# Patient Record
Sex: MALE | Race: WHITE | NOT HISPANIC OR LATINO | Employment: OTHER | URBAN - METROPOLITAN AREA
[De-identification: names, ages, dates, MRNs, and addresses within clinical notes are randomized per-mention and may not be internally consistent; named-entity substitution may affect disease eponyms.]

---

## 2022-10-17 ENCOUNTER — HOSPITAL ENCOUNTER (EMERGENCY)
Facility: HOSPITAL | Age: 47
Discharge: HOME | End: 2022-10-17
Attending: EMERGENCY MEDICINE
Payer: COMMERCIAL

## 2022-10-17 VITALS
OXYGEN SATURATION: 96 % | RESPIRATION RATE: 18 BRPM | DIASTOLIC BLOOD PRESSURE: 79 MMHG | SYSTOLIC BLOOD PRESSURE: 142 MMHG | TEMPERATURE: 98.3 F | HEART RATE: 92 BPM

## 2022-10-17 DIAGNOSIS — R04.0 EPISTAXIS: Primary | ICD-10-CM

## 2022-10-17 LAB
ANION GAP SERPL CALCULATED.3IONS-SCNC: 9 MMOL/L (ref 4–13)
APTT PPP: 25 SECONDS (ref 23–37)
ATRIAL RATE: 91 BPM
BASOPHILS # BLD AUTO: 0.07 THOUSANDS/ÂΜL (ref 0–0.1)
BASOPHILS NFR BLD AUTO: 1 % (ref 0–1)
BUN SERPL-MCNC: 16 MG/DL (ref 5–25)
CALCIUM SERPL-MCNC: 8.6 MG/DL (ref 8.3–10.1)
CHLORIDE SERPL-SCNC: 103 MMOL/L (ref 96–108)
CO2 SERPL-SCNC: 25 MMOL/L (ref 21–32)
CREAT SERPL-MCNC: 0.99 MG/DL (ref 0.6–1.3)
EOSINOPHIL # BLD AUTO: 0.2 THOUSAND/ÂΜL (ref 0–0.61)
EOSINOPHIL NFR BLD AUTO: 2 % (ref 0–6)
ERYTHROCYTE [DISTWIDTH] IN BLOOD BY AUTOMATED COUNT: 12.7 % (ref 11.6–15.1)
GFR SERPL CREATININE-BSD FRML MDRD: 89 ML/MIN/1.73SQ M
GLUCOSE SERPL-MCNC: 128 MG/DL (ref 65–140)
HCT VFR BLD AUTO: 48.5 % (ref 36.5–49.3)
HGB BLD-MCNC: 17 G/DL (ref 12–17)
IMM GRANULOCYTES # BLD AUTO: 0.05 THOUSAND/UL (ref 0–0.2)
IMM GRANULOCYTES NFR BLD AUTO: 0 % (ref 0–2)
INR PPP: 1.02 (ref 0.84–1.19)
LYMPHOCYTES # BLD AUTO: 3.39 THOUSANDS/ÂΜL (ref 0.6–4.47)
LYMPHOCYTES NFR BLD AUTO: 28 % (ref 14–44)
MCH RBC QN AUTO: 32.9 PG (ref 26.8–34.3)
MCHC RBC AUTO-ENTMCNC: 35.1 G/DL (ref 31.4–37.4)
MCV RBC AUTO: 94 FL (ref 82–98)
MONOCYTES # BLD AUTO: 0.94 THOUSAND/ÂΜL (ref 0.17–1.22)
MONOCYTES NFR BLD AUTO: 8 % (ref 4–12)
NEUTROPHILS # BLD AUTO: 7.66 THOUSANDS/ÂΜL (ref 1.85–7.62)
NEUTS SEG NFR BLD AUTO: 61 % (ref 43–75)
NRBC BLD AUTO-RTO: 0 /100 WBCS
P AXIS: 63 DEGREES
PLATELET # BLD AUTO: 212 THOUSANDS/UL (ref 149–390)
PMV BLD AUTO: 9.1 FL (ref 8.9–12.7)
POTASSIUM SERPL-SCNC: 3.1 MMOL/L (ref 3.5–5.3)
PR INTERVAL: 136 MS
PROTHROMBIN TIME: 13.2 SECONDS (ref 11.6–14.5)
QRS AXIS: -68 DEGREES
QRSD INTERVAL: 84 MS
QT INTERVAL: 356 MS
QTC INTERVAL: 437 MS
RBC # BLD AUTO: 5.16 MILLION/UL (ref 3.88–5.62)
SODIUM SERPL-SCNC: 137 MMOL/L (ref 135–147)
T WAVE AXIS: 49 DEGREES
VENTRICULAR RATE: 91 BPM
WBC # BLD AUTO: 12.31 THOUSAND/UL (ref 4.31–10.16)

## 2022-10-17 PROCEDURE — 30901 CONTROL OF NOSEBLEED: CPT | Performed by: EMERGENCY MEDICINE

## 2022-10-17 PROCEDURE — 85610 PROTHROMBIN TIME: CPT | Performed by: EMERGENCY MEDICINE

## 2022-10-17 PROCEDURE — 99282 EMERGENCY DEPT VISIT SF MDM: CPT | Performed by: EMERGENCY MEDICINE

## 2022-10-17 PROCEDURE — 99283 EMERGENCY DEPT VISIT LOW MDM: CPT

## 2022-10-17 PROCEDURE — 93010 ELECTROCARDIOGRAM REPORT: CPT | Performed by: INTERNAL MEDICINE

## 2022-10-17 PROCEDURE — 93005 ELECTROCARDIOGRAM TRACING: CPT

## 2022-10-17 PROCEDURE — 85025 COMPLETE CBC W/AUTO DIFF WBC: CPT | Performed by: EMERGENCY MEDICINE

## 2022-10-17 PROCEDURE — 80048 BASIC METABOLIC PNL TOTAL CA: CPT | Performed by: EMERGENCY MEDICINE

## 2022-10-17 PROCEDURE — 36415 COLL VENOUS BLD VENIPUNCTURE: CPT | Performed by: EMERGENCY MEDICINE

## 2022-10-17 PROCEDURE — 85730 THROMBOPLASTIN TIME PARTIAL: CPT | Performed by: EMERGENCY MEDICINE

## 2022-10-17 RX ORDER — OXYMETAZOLINE HYDROCHLORIDE 0.05 G/100ML
2 SPRAY NASAL ONCE
Status: COMPLETED | OUTPATIENT
Start: 2022-10-17 | End: 2022-10-17

## 2022-10-17 RX ORDER — TRANEXAMIC ACID 100 MG/ML
1000 INJECTION, SOLUTION INTRAVENOUS ONCE
Status: COMPLETED | OUTPATIENT
Start: 2022-10-17 | End: 2022-10-17

## 2022-10-17 RX ADMIN — TRANEXAMIC ACID 1000 MG: 100 INJECTION, SOLUTION INTRAVENOUS at 16:55

## 2022-10-17 RX ADMIN — OXYMETAZOLINE HYDROCHLORIDE 2 SPRAY: 0.05 SPRAY NASAL at 15:47

## 2022-10-17 NOTE — ED PROVIDER NOTES
Pt Name: Collin Kendall  MRN: [id number]  [place] 1975  Age/Sex: 49 y o  male  Date of evaluation: 10/17/2022  PCP: No primary care provider on file  CHIEF COMPLAINT    Chief Complaint   Patient presents with   • Nose Bleed     Nose bleed x 10 min tat as not stopped  HPI and MDM    49 y o  male presenting with nose bleed this started around 3:00 p m  when patient was trying to go to sleep  States it was a lot blood, lasted for 10 minutes  Is feeling lightheaded  It was going down his throat he was coughing blood up  No medical problems, no blood thinners or anti-platelet medication  No history of coagulopathy  No trauma or injuries  Currently nose bleed has stopped  ED Course as of 10/17/22 2130   Mon Oct 17, 2022   1610 Patient started bleeding again, primarily through left nare, sprayed Afrin, inserted TXA soaked gauze  However then patient started having some trickling down his right side  TXA soaked gauze and Afrin was pain in the right side as well, and nasal clamp applied  1703 Upon re-evaluation, the gauze was removed, the clamp was removed, patient's bleeding has stopped  He is feeling much better  He was monitored in the emergency department, and continued to have no more epistaxis  He is advised supportive care at home, using a humidifier, avoiding dry heat, ENT follow-up, return precautions discussed  Pt informed of hypokalemia  Advised dietary modifications and PCP f/u  Medications   oxymetazoline (AFRIN) 0 05 % nasal spray 2 spray (2 sprays Each Nare Given 10/17/22 1547)   tranexamic acid 100mg/mL (for epistaxis) 1,000 mg (1,000 mg Nasal Given by Other 10/17/22 1655)         Past Medical and Surgical History    History reviewed  No pertinent past medical history  History reviewed  No pertinent surgical history  History reviewed  No pertinent family history      Social History     Tobacco Use   • Smoking status: Current Every Day Smoker Packs/day: 2 00   • Smokeless tobacco: Never Used   Substance Use Topics   • Alcohol use: Never   • Drug use: Never           Allergies    No Known Allergies    Home Medications    Prior to Admission medications    Not on File           Review of Systems    Review of Systems   Constitutional: Negative for chills and fever  HENT: Positive for nosebleeds  Negative for rhinorrhea and sore throat  Eyes: Negative for pain and visual disturbance  Respiratory: Negative for cough and shortness of breath  Cardiovascular: Negative for chest pain and leg swelling  Gastrointestinal: Negative for abdominal pain, nausea and vomiting  Genitourinary: Negative for dysuria and hematuria  Musculoskeletal: Negative for back pain and myalgias  Skin: Negative for rash and wound  Neurological: Positive for light-headedness  Negative for syncope and headaches  Physical Exam      ED Triage Vitals [10/17/22 1512]   Temperature Pulse Respirations Blood Pressure SpO2   98 3 °F (36 8 °C) (!) 124 20 153/97 97 %      Temp src Heart Rate Source Patient Position - Orthostatic VS BP Location FiO2 (%)   -- -- -- -- --      Pain Score       No Pain               Physical Exam  Constitutional:       General: He is not in acute distress  Appearance: He is not ill-appearing  HENT:      Head: Normocephalic and atraumatic  Nose:      Comments: Bleeding through left side of nose dried blood present     Mouth/Throat:      Mouth: Mucous membranes are moist    Eyes:      Extraocular Movements: Extraocular movements intact  Pupils: Pupils are equal, round, and reactive to light  Cardiovascular:      Rate and Rhythm: Regular rhythm  Tachycardia present  Pulmonary:      Effort: No respiratory distress  Breath sounds: Normal breath sounds  No stridor  No wheezing, rhonchi or rales  Abdominal:      General: There is no distension  Tenderness: There is no abdominal tenderness     Musculoskeletal: General: No swelling or deformity  Normal range of motion  Cervical back: Normal range of motion and neck supple  Skin:     General: Skin is warm  Findings: No erythema  Neurological:      Mental Status: He is alert and oriented to person, place, and time  Mental status is at baseline                Diagnostic Results      Labs:    Results Reviewed     Procedure Component Value Units Date/Time    Basic metabolic panel [[id number]]  (Abnormal) Collected: 10/17/22 1547    Lab Status: Final result Specimen: Blood from Arm, Right Updated: 10/17/22 1614     Sodium 137 mmol/L      Potassium 3 1 mmol/L      Chloride 103 mmol/L      CO2 25 mmol/L      ANION GAP 9 mmol/L      BUN 16 mg/dL      Creatinine 0 99 mg/dL      Glucose 128 mg/dL      Calcium 8 6 mg/dL      eGFR 89 ml/min/1 73sq m     Narrative:      [place] guidelines for Chronic Kidney Disease (CKD):   •  Stage 1 with normal or high GFR (GFR > 90 mL/min/1 73 square meters)  •  Stage 2 Mild CKD (GFR = 60-89 mL/min/1 73 square meters)  •  Stage 3A Moderate CKD (GFR = 45-59 mL/min/1 73 square meters)  •  Stage 3B Moderate CKD (GFR = 30-44 mL/min/1 73 square meters)  •  Stage 4 Severe CKD (GFR = 15-29 mL/min/1 73 square meters)  •  Stage 5 End Stage CKD (GFR <15 mL/min/1 73 square meters)  Note: GFR calculation is accurate only with a steady state creatinine    Protime-INR [[id number]]  (Normal) Collected: 10/17/22 1547    Lab Status: Final result Specimen: Blood from Arm, Right Updated: 10/17/22 1612     Protime 13 2 seconds      INR 1 02    APTT [[id number]]  (Normal) Collected: 10/17/22 1547    Lab Status: Final result Specimen: Blood from Arm, Right Updated: 10/17/22 1612     PTT 25 seconds     CBC and differential [[id number]]  (Abnormal) Collected: 10/17/22 1547    Lab Status: Final result Specimen: Blood from Arm, Right Updated: 10/17/22 1552     WBC 12 31 Thousand/uL      RBC 5 16 Million/uL      Hemoglobin 17 0 g/dL Hematocrit 48 5 %      MCV 94 fL      MCH 32 9 pg      MCHC 35 1 g/dL      RDW 12 7 %      MPV 9 1 fL      Platelets 516 Thousands/uL      nRBC 0 /100 WBCs      Neutrophils Relative 61 %      Immat GRANS % 0 %      Lymphocytes Relative 28 %      Monocytes Relative 8 %      Eosinophils Relative 2 %      Basophils Relative 1 %      Neutrophils Absolute 7 66 Thousands/µL      Immature Grans Absolute 0 05 Thousand/uL      Lymphocytes Absolute 3 39 Thousands/µL      Monocytes Absolute 0 94 Thousand/µL      Eosinophils Absolute 0 20 Thousand/µL      Basophils Absolute 0 07 Thousands/µL           All labs reviewed and utilized in the medical decision making process    Radiology:    No orders to display       All radiology studies independently viewed by me and interpreted by the radiologist     Procedure    Procedures        FINAL IMPRESSION    Final diagnoses:   Epistaxis         DISPOSITION    Time reflects when diagnosis was documented in both MDM as applicable and the Disposition within this note     Time User Action Codes Description Comment    10/17/2022  5:00 PM Florentin Mcbride Add [R04 0] Epistaxis       ED Disposition     ED Disposition   Discharge    Condition   Stable    Date/Time   Mon Oct 17, 2022  5:00 PM    Comment   Frank Cm discharge to home/self care  Follow-up Information     Follow up With Specialties Details Why Contact Info Additional Information    [facility], [address] Otolaryngology Call today For follow up [address], [address] [place] [address], [facility] , [address]  [place] [address]            PATIENT REFERRED TO:    [facility]  [address] [address] [phone]  Call today  For follow up      DISCHARGE MEDICATIONS:    There are no discharge medications for this patient  No discharge procedures on file  Emma Ocampo DO        This note was partially completed using voice recognition technology, and was scanned for gross errors; however some errors may still exist  Please contact the author with any questions or requests for clarification        Emma Ocampo DO  10/17/22 2677

## 2022-10-18 ENCOUNTER — HOSPITAL ENCOUNTER (EMERGENCY)
Facility: HOSPITAL | Age: 47
Discharge: HOME | End: 2022-10-18
Attending: EMERGENCY MEDICINE
Payer: COMMERCIAL

## 2022-10-18 VITALS
SYSTOLIC BLOOD PRESSURE: 122 MMHG | TEMPERATURE: 98.7 F | DIASTOLIC BLOOD PRESSURE: 76 MMHG | RESPIRATION RATE: 20 BRPM | OXYGEN SATURATION: 98 % | HEART RATE: 82 BPM

## 2022-10-18 DIAGNOSIS — R04.0 LEFT-SIDED EPISTAXIS: Primary | ICD-10-CM

## 2022-10-18 PROCEDURE — 99284 EMERGENCY DEPT VISIT MOD MDM: CPT | Performed by: PHYSICIAN ASSISTANT

## 2022-10-18 PROCEDURE — 99283 EMERGENCY DEPT VISIT LOW MDM: CPT

## 2022-10-18 RX ORDER — TRANEXAMIC ACID 100 MG/ML
500 INJECTION, SOLUTION INTRAVENOUS ONCE
Status: COMPLETED | OUTPATIENT
Start: 2022-10-18 | End: 2022-10-18

## 2022-10-18 RX ADMIN — TRANEXAMIC ACID 500 MG: 100 INJECTION, SOLUTION INTRAVENOUS at 03:45

## 2022-10-18 NOTE — ED NOTES
Patient called this writer in the room  Patient stated that he feels light headed and that he saw some black dots in his vision  This rn reducated the patient to hold pressure on his nose until the provider reevaluates him  Patient reports that his nose becomes clogged and it goes to his ear and then he has a hard time hearing  Provider notified  ALIA Monaco RN  10/18/22 7382

## 2022-10-18 NOTE — ED PROVIDER NOTES
History  Chief Complaint   Patient presents with   • Nose Bleed     Seen here earlier today for same, recurrence of nose bleed this evening  Patient not holding pressure to nasal bridge despite staff instruction  Patient is a 67-year-old male with no significant past medical history presenting to the emergency department for evaluation of left-sided epistaxis  He was seen here earlier today for the same complaint  Bleeding was stopped with Afrin and TXA earlier, patient states that the bleeding recurred several times throughout the day  Patient not currently bleeding  Symptoms have been ongoing for approximately 12 hours  He is not having any fevers, chills, chest pain, difficulty breathing, palpitations, weakness, lightheadedness, dizziness  Patient does not take blood thinners  No other complaints at this time  Denies any trauma to the nose  History provided by:  Patient   used: No    Nose Bleed  Location:  L nare  Severity:  Mild  Duration:  12 hours  Timing:  Intermittent  Progression:  Resolved  Chronicity:  New  Context: not anticoagulants, not aspirin use, not bleeding disorder, not home oxygen, not nose picking, not thrombocytopenia and not trauma    Relieved by:  Applying pressure and vasoconstrictors  Worsened by:  Nothing  Ineffective treatments:  None tried  Associated symptoms: no blood in oropharynx, no congestion, no cough, no dizziness, no facial pain, no fever, no headaches, no sinus pain, no sneezing, no sore throat and no syncope    Risk factors: no frequent nosebleeds        None       History reviewed  No pertinent past medical history  History reviewed  No pertinent surgical history  History reviewed  No pertinent family history  I have reviewed and agree with the history as documented      E-Cigarette/Vaping     E-Cigarette/Vaping Substances     Social History     Tobacco Use   • Smoking status: Current Every Day Smoker     Packs/day: 2 00   • Smokeless tobacco: Never Used   Substance Use Topics   • Alcohol use: Never   • Drug use: Never       Review of Systems   Constitutional: Negative for fever  HENT: Positive for nosebleeds  Negative for congestion, sinus pain, sneezing and sore throat  Respiratory: Negative for cough and shortness of breath  Cardiovascular: Negative for chest pain, palpitations and syncope  Gastrointestinal: Negative for abdominal pain, diarrhea, nausea and vomiting  Neurological: Negative for dizziness and headaches  All other systems reviewed and are negative  Physical Exam  Physical Exam  Vitals reviewed  Constitutional:       General: He is not in acute distress  Appearance: Normal appearance  He is normal weight  He is not ill-appearing, toxic-appearing or diaphoretic  HENT:      Head: Normocephalic and atraumatic  Right Ear: External ear normal       Left Ear: External ear normal       Nose:      Right Nostril: Epistaxis (Dried blood in the nare  No active bleeding) present  No foreign body, septal hematoma or occlusion  Left Nostril: Epistaxis (Dried blood in the nare  No active bleeding) present  No foreign body, septal hematoma or occlusion  Mouth/Throat:      Mouth: Mucous membranes are moist       Pharynx: Oropharynx is clear  No oropharyngeal exudate or posterior oropharyngeal erythema  Eyes:      General: No scleral icterus  Right eye: No discharge  Left eye: No discharge  Extraocular Movements: Extraocular movements intact  Conjunctiva/sclera: Conjunctivae normal    Cardiovascular:      Rate and Rhythm: Normal rate and regular rhythm  Pulses: Normal pulses  Heart sounds: Normal heart sounds  No murmur heard  No friction rub  No gallop  Pulmonary:      Effort: Pulmonary effort is normal  No respiratory distress  Breath sounds: Normal breath sounds  No stridor  No wheezing, rhonchi or rales     Musculoskeletal:         General: Normal range of motion  Cervical back: Normal range of motion and neck supple  Right lower leg: No edema  Left lower leg: No edema  Skin:     General: Skin is warm and dry  Capillary Refill: Capillary refill takes less than 2 seconds  Neurological:      General: No focal deficit present  Mental Status: He is alert and oriented to person, place, and time  Psychiatric:         Mood and Affect: Mood normal          Behavior: Behavior normal          Vital Signs  ED Triage Vitals   Temperature Pulse Respirations Blood Pressure SpO2   10/18/22 0208 10/18/22 0208 10/18/22 0208 10/18/22 0208 10/18/22 0208   98 7 °F (37 1 °C) 97 18 137/87 98 %      Temp Source Heart Rate Source Patient Position - Orthostatic VS BP Location FiO2 (%)   10/18/22 0208 10/18/22 0208 10/18/22 0208 10/18/22 0208 --   Temporal Monitor Sitting Left arm       Pain Score       10/18/22 0238       No Pain           Vitals:    10/18/22 0208 10/18/22 0238   BP: 137/87 122/76   Pulse: 97 82   Patient Position - Orthostatic VS: Sitting Sitting         Visual Acuity      ED Medications  Medications   tranexamic acid 100mg/mL (for epistaxis) 500 mg (500 mg Nasal Given 10/18/22 0345)       Diagnostic Studies  Results Reviewed     None                 No orders to display              Procedures  Procedures         ED Course                               SBIRT 20yo+    Flowsheet Row Most Recent Value   SBIRT (23 yo +)    In order to provide better care to our patients, we are screening all of our patients for alcohol and drug use  Would it be okay to ask you these screening questions? Yes Filed at: 10/18/2022 0231   Initial Alcohol Screen: US AUDIT-C     1  How often do you have a drink containing alcohol? 1 Filed at: 10/18/2022 0231   2  How many drinks containing alcohol do you have on a typical day you are drinking? 0 Filed at: 10/18/2022 0231   3a  Male UNDER 65: How often do you have five or more drinks on one occasion?  0 Filed at: 10/18/2022 0231   Audit-C Score 1 Filed at: 10/18/2022 0231   CHRIS: How many times in the past year have you    Used an illegal drug or used a prescription medication for non-medical reasons? Never Filed at: 10/18/2022 0231                    [facility]  Number of Diagnoses or Management Options  Left-sided epistaxis  Diagnosis management comments: Patient presenting for evaluation of intermittent epistaxis over the last 12 hours  Does not take blood thinners  Vital signs unremarkable  He has some dried blood in his bilateral nares upon my examination  He reports bleeding only out of the left nostril, however when he occluded the left nostril to stop the bleeding blood came out of the right side as well  No active bleeding upon my assessment  Atomized TXA applied to the left nostril  Patient was discharged home with instructions to follow-up with ENT  Strict return precautions were discussed  He is in stable condition at time of discharge  Amount and/or Complexity of Data Reviewed  Clinical lab tests: reviewed  Review and summarize past medical records: yes    Risk of Complications, Morbidity, and/or Mortality  Presenting problems: low  Diagnostic procedures: low  Management options: low    Patient Progress  Patient progress: stable      Disposition  Final diagnoses:   Left-sided epistaxis     Time reflects when diagnosis was documented in both MDM as applicable and the Disposition within this note     Time User Action Codes Description Comment    10/18/2022  3:40 AM Roro Jackson Add [R04 0] Left-sided epistaxis       ED Disposition     ED Disposition   Discharge    Condition   Stable    Date/Time   Tue Oct 18, 2022  3:40 AM    Comment   Frank Cm discharge to home/self care                 Follow-up Information     Follow up With Specialties Details Why Contact Info Additional [address] [facility] Emergency Department Emergency Medicine Go to  If symptoms worsen [address]  [facility]  30 [facility] [address]  [phone] 5[address] [facility] Emergency Department, [address] [facility] [place], [place], [place], [facility] [place]  Throat Otolaryngology Schedule an appointment as soon as possible for a visit  For follow up [address] [place], [facility] [address]          There are no discharge medications for this patient  No discharge procedures on file      PDMP Review     None          ED Provider  Electronically Signed by           Jordana Garnica PA-C  10/18/22 7922

## 2022-10-20 ENCOUNTER — ANESTHESIA EVENT (OUTPATIENT)
Dept: PERIOP | Facility: HOSPITAL | Age: 47
End: 2022-10-20
Payer: COMMERCIAL

## 2022-10-20 PROBLEM — R04.0 POSTERIOR EPISTAXIS: Status: ACTIVE | Noted: 2022-10-20

## 2022-10-20 NOTE — H&P (VIEW-ONLY)
Otolaryngology - Head and Neck Surgery  Follow-up    Frank Cm is a 49 y o  who returned for follow up evaluation of epistaxis     HPI:    Dr Halle Bueno evaluated yesterday without active bleeding/definite source identified  He reported nosebleed on Monday, went to ER- sprayed something in the nose, sent home  Restarted again "heavy"  Stopped with pinching the nose  Then again overnight, went to ED  Helped until early yesterday  Always the left side  Episodes of brisk  Lying down usually when it happens  Sleeping on recliner the past couple days  No blood thinners  No bleeding/bruising issues  New onset    No seasonal allergies  No URI type symptoms  No hx of nasal trauma     Historical Information   Past Medical History:   Diagnosis Date   • Ear problems    • Nosebleed      History reviewed  No pertinent surgical history  Social History   Social History     Substance and Sexual Activity   Alcohol Use Never     Social History     Substance and Sexual Activity   Drug Use Never     Social History     Tobacco Use   Smoking Status Current Every Day Smoker   • Packs/day: 2 00   Smokeless Tobacco Never Used     No family history on file  Meds/Allergies     Current Outpatient Medications on File Prior to Visit   Medication Sig Dispense Refill   • amLODIPine (NORVASC) 10 mg tablet        No current facility-administered medications on file prior to visit  No Known Allergies        Physical exam:     Ht 5' 7" (1 702 m)   Wt 93 kg (205 lb)   BMI 32 11 kg/m²     Gen: NAD, cooperative, well nourished  Voice: clear  Head: normocephalic, atraumatic  Face: no facial asymmetry  TMJ: Nontender, no crepitus, no subluxation  Eyes: without edema or ecchymosis  Nose: External nose without lesions  Nares with mild dry blood suctioned  No pus  No polyps  Nasal septum mildly deviated  Inferior turbinates pink and without hypertrophy      Sinuses: No tenderness to palpation of maxillary and frontal sinuses  Oral cavity: No lesions/masses  Tongue mobile and soft  No abnormality of parotid ducts  Oropharynx: No lesions/masses  mild cobblestoning  Tonsils without ulceration or exudate  Neck: No LAD  No masses  Normal crepitus of thyroid cartilage  Nontender  Salivary glands: Parotids nontender, non-enlarged  Submandibular glands nontender, non-enlarged  Thyroid: WIthout hypertrophy  No palpable nodules  Nontender  Neuro: Alert  CN III-XII grossly intact  Pulm: CTAB nonlabored, no stridor  CV: RRR  Extremities warm/perfused  abd soft/nontender      PROCEDURE: Nasal endoscopy    Indication:  epistaxis  Verbal consent obtained  Surgeon: Jatin Quintana MD  Anesthesia: 2% lidocaine, oxymetazoline  Scope passed through nasal cavities, blood suctioned; no active bleeding observed including after valsalva  Findings:  Nasal cavities: narrow with septal deviation  OMC: wnl  Sphenoethmoid recesses: wnl  Other: prominent vessel and thin trail of recent blood from under the left inferior turbinate posteriorly (sphenopalatine distribution)  Nasopharynx: unremarkable    Scope was removed  Patient tolerated procedure well without complications        Assessment:    Diagnosis ICD-10-CM Associated Orders   1  Posterior epistaxis  R04 0          Plan:    Left posterior epistaxis, not actively bleeding at this time  Suspect arterial based on history of bleeding (brisk/recurrent/episodic with periods of no bleeding, multiple trips to ED/ENT) as well as suspicious area left side under inferior turbinate (sphenopalatine distribution)    OR tomorrow for nasal endoscopy, control of posterior epistaxis  RBA discussed incl but not limited to bleeding, scar, injury to eye  Nosebleed precautions/handout  Instructed pt to go to ER if bleeding recurs before tomorrow - would then go to OR from there      He will return to my office 1 week postop    I spent 35 min with the patient including coordinating surgical plan

## 2022-10-21 ENCOUNTER — HOSPITAL ENCOUNTER (OUTPATIENT)
Facility: HOSPITAL | Age: 47
Setting detail: OUTPATIENT SURGERY
Discharge: HOME | End: 2022-10-21
Attending: OTOLARYNGOLOGY | Admitting: OTOLARYNGOLOGY
Payer: COMMERCIAL

## 2022-10-21 ENCOUNTER — ANESTHESIA (OUTPATIENT)
Dept: PERIOP | Facility: HOSPITAL | Age: 47
End: 2022-10-21
Payer: COMMERCIAL

## 2022-10-21 VITALS
TEMPERATURE: 97.1 F | RESPIRATION RATE: 16 BRPM | DIASTOLIC BLOOD PRESSURE: 87 MMHG | HEART RATE: 69 BPM | BODY MASS INDEX: 32.18 KG/M2 | SYSTOLIC BLOOD PRESSURE: 121 MMHG | WEIGHT: 205 LBS | OXYGEN SATURATION: 95 % | HEIGHT: 67 IN

## 2022-10-21 LAB
ATRIAL RATE: 65 BPM
P AXIS: 38 DEGREES
PR INTERVAL: 152 MS
QRS AXIS: -25 DEGREES
QRSD INTERVAL: 86 MS
QT INTERVAL: 390 MS
QTC INTERVAL: 405 MS
T WAVE AXIS: -4 DEGREES
VENTRICULAR RATE: 65 BPM

## 2022-10-21 PROCEDURE — 93005 ELECTROCARDIOGRAM TRACING: CPT

## 2022-10-21 PROCEDURE — 93010 ELECTROCARDIOGRAM REPORT: CPT | Performed by: INTERNAL MEDICINE

## 2022-10-21 RX ORDER — ONDANSETRON 2 MG/ML
INJECTION INTRAMUSCULAR; INTRAVENOUS AS NEEDED
Status: DISCONTINUED | OUTPATIENT
Start: 2022-10-21 | End: 2022-10-21

## 2022-10-21 RX ORDER — NEOSTIGMINE METHYLSULFATE 1 MG/ML
INJECTION INTRAVENOUS AS NEEDED
Status: DISCONTINUED | OUTPATIENT
Start: 2022-10-21 | End: 2022-10-21

## 2022-10-21 RX ORDER — DEXMEDETOMIDINE HYDROCHLORIDE 100 UG/ML
INJECTION, SOLUTION INTRAVENOUS AS NEEDED
Status: DISCONTINUED | OUTPATIENT
Start: 2022-10-21 | End: 2022-10-21

## 2022-10-21 RX ORDER — ACETAMINOPHEN 325 MG/1
650 TABLET ORAL EVERY 6 HOURS PRN
Status: DISCONTINUED | OUTPATIENT
Start: 2022-10-21 | End: 2022-10-21 | Stop reason: HOSPADM

## 2022-10-21 RX ORDER — FENTANYL CITRATE 50 UG/ML
INJECTION, SOLUTION INTRAMUSCULAR; INTRAVENOUS AS NEEDED
Status: DISCONTINUED | OUTPATIENT
Start: 2022-10-21 | End: 2022-10-21

## 2022-10-21 RX ORDER — SODIUM CHLORIDE, SODIUM LACTATE, POTASSIUM CHLORIDE, CALCIUM CHLORIDE 600; 310; 30; 20 MG/100ML; MG/100ML; MG/100ML; MG/100ML
INJECTION, SOLUTION INTRAVENOUS CONTINUOUS PRN
Status: DISCONTINUED | OUTPATIENT
Start: 2022-10-21 | End: 2022-10-21

## 2022-10-21 RX ORDER — LIDOCAINE HYDROCHLORIDE 10 MG/ML
INJECTION, SOLUTION EPIDURAL; INFILTRATION; INTRACAUDAL; PERINEURAL AS NEEDED
Status: DISCONTINUED | OUTPATIENT
Start: 2022-10-21 | End: 2022-10-21

## 2022-10-21 RX ORDER — PROPOFOL 10 MG/ML
INJECTION, EMULSION INTRAVENOUS AS NEEDED
Status: DISCONTINUED | OUTPATIENT
Start: 2022-10-21 | End: 2022-10-21

## 2022-10-21 RX ORDER — HYDROMORPHONE HCL IN WATER/PF 6 MG/30 ML
0.2 PATIENT CONTROLLED ANALGESIA SYRINGE INTRAVENOUS
Status: DISCONTINUED | OUTPATIENT
Start: 2022-10-21 | End: 2022-10-21 | Stop reason: HOSPADM

## 2022-10-21 RX ORDER — PROMETHAZINE HYDROCHLORIDE 25 MG/ML
25 INJECTION, SOLUTION INTRAMUSCULAR; INTRAVENOUS ONCE AS NEEDED
Status: DISCONTINUED | OUTPATIENT
Start: 2022-10-21 | End: 2022-10-21 | Stop reason: HOSPADM

## 2022-10-21 RX ORDER — FENTANYL CITRATE/PF 50 MCG/ML
25 SYRINGE (ML) INJECTION
Status: DISCONTINUED | OUTPATIENT
Start: 2022-10-21 | End: 2022-10-21 | Stop reason: HOSPADM

## 2022-10-21 RX ORDER — ROCURONIUM BROMIDE 10 MG/ML
INJECTION, SOLUTION INTRAVENOUS AS NEEDED
Status: DISCONTINUED | OUTPATIENT
Start: 2022-10-21 | End: 2022-10-21

## 2022-10-21 RX ORDER — MIDAZOLAM HYDROCHLORIDE 2 MG/2ML
INJECTION, SOLUTION INTRAMUSCULAR; INTRAVENOUS AS NEEDED
Status: DISCONTINUED | OUTPATIENT
Start: 2022-10-21 | End: 2022-10-21

## 2022-10-21 RX ORDER — ONDANSETRON 2 MG/ML
4 INJECTION INTRAMUSCULAR; INTRAVENOUS ONCE AS NEEDED
Status: DISCONTINUED | OUTPATIENT
Start: 2022-10-21 | End: 2022-10-21 | Stop reason: HOSPADM

## 2022-10-21 RX ORDER — OXYMETAZOLINE HYDROCHLORIDE 0.05 G/100ML
SPRAY NASAL AS NEEDED
Status: DISCONTINUED | OUTPATIENT
Start: 2022-10-21 | End: 2022-10-21 | Stop reason: HOSPADM

## 2022-10-21 RX ORDER — GLYCOPYRROLATE 0.2 MG/ML
INJECTION INTRAMUSCULAR; INTRAVENOUS AS NEEDED
Status: DISCONTINUED | OUTPATIENT
Start: 2022-10-21 | End: 2022-10-21

## 2022-10-21 RX ORDER — DEXAMETHASONE SODIUM PHOSPHATE 10 MG/ML
INJECTION, SOLUTION INTRAMUSCULAR; INTRAVENOUS AS NEEDED
Status: DISCONTINUED | OUTPATIENT
Start: 2022-10-21 | End: 2022-10-21

## 2022-10-21 RX ADMIN — GLYCOPYRROLATE 0.8 MG: 0.2 INJECTION, SOLUTION INTRAMUSCULAR; INTRAVENOUS at 08:50

## 2022-10-21 RX ADMIN — LIDOCAINE HYDROCHLORIDE 50 MG: 10 INJECTION, SOLUTION EPIDURAL; INFILTRATION; INTRACAUDAL; PERINEURAL at 08:05

## 2022-10-21 RX ADMIN — FENTANYL CITRATE 50 MCG: 50 INJECTION INTRAMUSCULAR; INTRAVENOUS at 08:01

## 2022-10-21 RX ADMIN — DEXAMETHASONE SODIUM PHOSPHATE 10 MG: 10 INJECTION, SOLUTION INTRAMUSCULAR; INTRAVENOUS at 08:05

## 2022-10-21 RX ADMIN — DEXMEDETOMIDINE HCL 12 MCG: 100 INJECTION INTRAVENOUS at 08:01

## 2022-10-21 RX ADMIN — PROPOFOL 200 MG: 10 INJECTION, EMULSION INTRAVENOUS at 08:05

## 2022-10-21 RX ADMIN — FENTANYL CITRATE 50 MCG: 50 INJECTION INTRAMUSCULAR; INTRAVENOUS at 08:20

## 2022-10-21 RX ADMIN — SODIUM CHLORIDE, SODIUM LACTATE, POTASSIUM CHLORIDE, AND CALCIUM CHLORIDE: .6; .31; .03; .02 INJECTION, SOLUTION INTRAVENOUS at 09:15

## 2022-10-21 RX ADMIN — MIDAZOLAM 2 MG: 1 INJECTION INTRAMUSCULAR; INTRAVENOUS at 08:01

## 2022-10-21 RX ADMIN — ONDANSETRON 4 MG: 2 INJECTION INTRAMUSCULAR; INTRAVENOUS at 08:01

## 2022-10-21 RX ADMIN — GLYCOPYRROLATE 0.1 MG: 0.2 INJECTION, SOLUTION INTRAMUSCULAR; INTRAVENOUS at 08:07

## 2022-10-21 RX ADMIN — SODIUM CHLORIDE, SODIUM LACTATE, POTASSIUM CHLORIDE, AND CALCIUM CHLORIDE: .6; .31; .03; .02 INJECTION, SOLUTION INTRAVENOUS at 08:00

## 2022-10-21 RX ADMIN — NEOSTIGMINE METHYLSULFATE 4 MG: 1 INJECTION INTRAVENOUS at 08:50

## 2022-10-21 RX ADMIN — ROCURONIUM BROMIDE 50 MG: 50 INJECTION INTRAVENOUS at 08:05

## 2022-10-21 RX ADMIN — DEXMEDETOMIDINE HCL 8 MCG: 100 INJECTION INTRAVENOUS at 08:20

## 2022-10-21 NOTE — OP NOTE
OPERATIVE REPORT  PATIENT NAME: Frank Cm    :  1975  MRN: [id number]  Pt Location:  OR ROOM 05    SURGERY DATE: 10/21/2022    Surgeon(s) and Role:     * Chelsy Edmondson MD - Primary    Preop Diagnosis:  Posterior epistaxis [R04 0]    Post-Op Diagnosis Codes:     * Posterior epistaxis [R04 0]    Procedure(s) (LRB):  NASAL ENDOSCOPY WITH CONTROL OF EPISTAXIS (N/A)    Specimen(s):  * No specimens in log *    Estimated Blood Loss:   Minimal    Drains:  * No LDAs found *    Anesthesia Type:   General    Operative Indications:  Posterior epistaxis [R04 0]  ***    Operative Findings:  ***    Complications:   {Post Op Complications:20197}    Procedure and Technique:  ***   {Op note attestation:18099}    Patient Disposition:  {op note disposition:91410}        SIGNATURE: Chelsy Edmondson MD  DATE: 2022  TIME: 9:05 AM

## 2022-10-21 NOTE — ANESTHESIA POSTPROCEDURE EVALUATION
Post-Op Assessment Note    CV Status:  Stable    Pain management: adequate     Mental Status:  Alert and awake   Hydration Status:  Euvolemic and stable   PONV Controlled:  Controlled   Airway Patency:  Patent   Two or more mitigation strategies used for obstructive sleep apnea   Post Op Vitals Reviewed: Yes      Staff: CRNA, Anesthesiologist         No complications documented      BP   104/70   Temp   97   Pulse  85   Resp   14   SpO2   95

## 2022-10-21 NOTE — DISCHARGE INSTRUCTIONS
Adhere to nosebleed precautions as discussed for 1 week    May start nasal saline sprays to nostrils daily tomorrow    Afrin (oxymetazoline) sprays to nares if nosebleed    Follow up with Dr Alix Sow in 1-2 weeks as scheduled

## 2022-10-21 NOTE — ANESTHESIA PREPROCEDURE EVALUATION
Procedure:  NASAL ENDOSCOPY WITH CONTROL OF EPISTAXIS (N/A Nose)    Relevant Problems   No relevant active problems        Physical Exam    Airway    Mallampati score: II  TM Distance: >3 FB  Neck ROM: full     Dental   No notable dental hx     Cardiovascular  Rhythm: regular, Rate: normal, Cardiovascular exam normal    Pulmonary  Pulmonary exam normal Breath sounds clear to auscultation,     Other Findings        Anesthesia Plan  ASA Score- 2     Anesthesia Type- general with ASA Monitors  Additional Monitors:   Airway Plan: ETT  Plan Factors-Exercise tolerance (METS): >4 METS  Chart reviewed  EKG reviewed  Imaging results reviewed  Existing labs reviewed  Patient summary reviewed  Induction- intravenous  Postoperative Plan- Plan for postoperative opioid use  Informed Consent- Anesthetic plan and risks discussed with patient  I personally reviewed this patient with the CRNA  Discussed and agreed on the Anesthesia Plan with the CRNA  Dallas Stockton

## 2022-10-21 NOTE — INTERVAL H&P NOTE
H&P reviewed  After examining the patient I find no changes in the patients condition since the H&P had been written      Vitals:    10/21/22 0639   BP: 114/79   Pulse: 68   Resp: 18   Temp: 97 6 °F (36 4 °C)   SpO2: 96%

## 2022-10-21 NOTE — INTERIM OP NOTE
NASAL ENDOSCOPY WITH CONTROL OF EPISTAXIS  Postoperative Note  PATIENT NAME: Frank Cm  : 1975  MRN: [id number]  [facility] OR ROOM 05    Surgery Date: 10/21/2022    Preop Diagnosis:  Posterior epistaxis [R04 0]    Post-Op Diagnosis Codes:     * Posterior epistaxis [R04 0]    Procedure(s) (LRB):  BILATERAL NASAL ENDOSCOPY WITH CONTROL OF POSTERIOR EPISTAXIS    Surgeon(s) and Role:     * Tri Pisano MD - Primary    Specimens:  * No specimens in log *    Estimated Blood Loss:   10 mL    Anesthesia Type:   General     Findings:   Bleeding from underside of left inferior turbinate (sphenopalatine brs)  Mobilized inferior turbinate medially to access the site of bleeding, cautery performed, surgicel was placed  Posterior surface of inferior turbinate was also cauterized    No bleeding with valsalva    Complications:   None    SIGNATURE: Tri Pisano   DATE: 2022   TIME: 9:02 AM

## 2022-10-31 PROBLEM — F17.200 TOBACCO DEPENDENCE: Status: ACTIVE | Noted: 2022-10-31

## 2022-10-31 PROBLEM — R06.83 SNORING: Status: ACTIVE | Noted: 2022-10-31

## 2022-10-31 PROBLEM — E66.811 CLASS 1 OBESITY WITH BODY MASS INDEX (BMI) OF 32.0 TO 32.9 IN ADULT: Status: ACTIVE | Noted: 2022-10-31

## 2022-10-31 PROBLEM — E66.9 CLASS 1 OBESITY WITH BODY MASS INDEX (BMI) OF 32.0 TO 32.9 IN ADULT: Status: ACTIVE | Noted: 2022-10-31

## 2022-10-31 PROBLEM — I10 HYPERTENSION: Status: ACTIVE | Noted: 2022-10-31

## 2022-10-31 PROBLEM — R06.81 WITNESSED EPISODE OF APNEA: Status: ACTIVE | Noted: 2022-10-31

## 2022-10-31 PROBLEM — J32.9 RECURRENT RHINOSINUSITIS: Status: ACTIVE | Noted: 2022-10-31

## 2022-12-30 PROBLEM — J32.9 RECURRENT RHINOSINUSITIS: Status: RESOLVED | Noted: 2022-10-31 | Resolved: 2022-12-30

## 2023-05-30 ENCOUNTER — OFFICE VISIT (OUTPATIENT)
Dept: FAMILY MEDICINE CLINIC | Facility: CLINIC | Age: 48
End: 2023-05-30

## 2023-05-30 VITALS
DIASTOLIC BLOOD PRESSURE: 72 MMHG | OXYGEN SATURATION: 95 % | HEART RATE: 80 BPM | WEIGHT: 218 LBS | SYSTOLIC BLOOD PRESSURE: 124 MMHG | BODY MASS INDEX: 34.21 KG/M2 | TEMPERATURE: 99.1 F | HEIGHT: 67 IN

## 2023-05-30 DIAGNOSIS — I10 PRIMARY HYPERTENSION: ICD-10-CM

## 2023-05-30 DIAGNOSIS — Z12.11 SCREENING FOR COLORECTAL CANCER: ICD-10-CM

## 2023-05-30 DIAGNOSIS — Z12.5 SCREENING FOR MALIGNANT NEOPLASM OF PROSTATE: ICD-10-CM

## 2023-05-30 DIAGNOSIS — Z00.00 ANNUAL PHYSICAL EXAM: Primary | ICD-10-CM

## 2023-05-30 DIAGNOSIS — Z12.12 SCREENING FOR COLORECTAL CANCER: ICD-10-CM

## 2023-05-30 DIAGNOSIS — F17.200 TOBACCO DEPENDENCE: ICD-10-CM

## 2023-05-30 DIAGNOSIS — E66.9 CLASS 1 OBESITY WITH BODY MASS INDEX (BMI) OF 32.0 TO 32.9 IN ADULT, UNSPECIFIED OBESITY TYPE, UNSPECIFIED WHETHER SERIOUS COMORBIDITY PRESENT: ICD-10-CM

## 2023-05-30 DIAGNOSIS — R79.89 LOW VITAMIN D LEVEL: ICD-10-CM

## 2023-05-30 DIAGNOSIS — R06.83 SNORING: ICD-10-CM

## 2023-05-30 DIAGNOSIS — Z13.1 SCREENING FOR DIABETES MELLITUS: ICD-10-CM

## 2023-05-30 DIAGNOSIS — Z13.6 SCREENING FOR CARDIOVASCULAR CONDITION: ICD-10-CM

## 2023-05-30 PROBLEM — R04.0 EPISTAXIS: Status: RESOLVED | Noted: 2022-10-20 | Resolved: 2023-05-30

## 2023-05-30 NOTE — PROGRESS NOTES
[facility] [address] [facility]    NAME: Frank Cm  AGE: 51 y o  SEX: male  : 1975     DATE: 2023     Assessment and Plan:     Problem List Items Addressed This Visit        Cardiovascular and Mediastinum    Hypertension     Stable on amlodipine  Dietary changes, exercise and weight loss encouraged             Other    Snoring     possibel underlying DONN  Discussed possible treatment for this if testosterone is low         Class 1 obesity with body mass index (BMI) of 32 0 to 32 9 in adult    Tobacco dependence     2 PPD for the last 6 years, remote smoking history prior to this  Cessation encouraged         Other Visit Diagnoses     Annual physical exam    -  Primary    Relevant Orders    Testosterone, free, total    Screening for colorectal cancer        Relevant Orders    Ambulatory referral to Gastroenterology    Ambulatory referral for Colonoscopy    Screening for diabetes mellitus        Relevant Orders    Hemoglobin A1C    Comprehensive metabolic panel    Screening for cardiovascular condition        Relevant Orders    Lipid panel    CBC and Platelet    Comprehensive metabolic panel    TSH, 3rd generation with Free T4 reflex    BMI 34 0-34 9,adult        Relevant Orders    TSH, 3rd generation with Free T4 reflex    Low vitamin D level        Relevant Orders    Vitamin D 25 hydroxy    Screening for malignant neoplasm of prostate        Relevant Orders    PSA, Total Screen          Immunizations and preventive care screenings were discussed with patient today  Appropriate education was printed on patient's after visit summary  Discussed risks and benefits of prostate cancer screening  We discussed the controversial history of PSA screening for prostate cancer in the [place] as well as the risk of over detection and over treatment of prostate cancer by way of PSA screening    The patient understands that PSA blood testing is an imperfect way to screen for prostate cancer and that elevated PSA levels in the blood may also be caused by infection, inflammation, prostatic trauma or manipulation, urological procedures, or by benign prostatic enlargement  The role of the digital rectal examination in prostate cancer screening was also discussed and I discussed with him that there is large interobserver variability in the findings of digital rectal examination  Counseling:  Exercise: the importance of regular exercise/physical activity was discussed  Recommend exercise 3-5 times per week for at least 30 minutes  BMI Counseling: Body mass index is 34 14 kg/m²  The BMI is above normal  Nutrition recommendations include decreasing portion sizes, encouraging healthy choices of fruits and vegetables, decreasing fast food intake, consuming healthier snacks, limiting drinks that contain sugar and moderation in carbohydrate intake  Exercise recommendations include moderate physical activity 150 minutes/week, exercising 3-5 times per week and strength training exercises  No pharmacotherapy was ordered  Rationale for BMI follow-up plan is due to patient being overweight or obese  Depression Screening and Follow-up Plan: Patient was screened for depression during today's encounter  They screened negative with a PHQ-2 score of 0  Return in 6 months (on 11/30/2023)  Chief Complaint:     Chief Complaint   Patient presents with   • New Patient Visit   • Physical Exam      History of Present Illness:     Adult Annual Physical   Patient here for a comprehensive physical exam  The patient reports problems - see below  Wants testosterone checked     Diet and Physical Activity  Diet/Nutrition: poor diet  Exercise: no formal exercise        Depression Screening  PHQ-2/9 Depression Screening    Little interest or pleasure in doing things: 0 - not at all  Feeling down, depressed, or hopeless: 0 - not at all  PHQ-2 Score: 0  PHQ-2 Interpretation: Negative depression screen       General Health  Sleep: sleeps well  Hearing: normal - bilateral   Vision: no vision problems  Dental: regular dental visits   Health  Symptoms include: none     Review of Systems:     Review of Systems   Constitutional: Negative for chills, fatigue and fever  HENT: Negative for rhinorrhea and sore throat  Eyes: Negative for visual disturbance  Respiratory: Negative for cough and shortness of breath  Cardiovascular: Negative for chest pain and palpitations  Gastrointestinal: Negative for abdominal pain, constipation, diarrhea, nausea and vomiting  Genitourinary: Negative for difficulty urinating, dysuria and frequency  Musculoskeletal: Negative for arthralgias and myalgias  Skin: Negative for color change and rash  Neurological: Negative for weakness and headaches  Past Medical History:     Past Medical History:   Diagnosis Date   • Ear problems    • Nosebleed       Past Surgical History:     Past Surgical History:   Procedure Laterality Date   • [place] NASAL/SINUS NDSC SURG W/CONTROL NASAL HEMRRG N/A 10/21/2022    Procedure: NASAL ENDOSCOPY WITH CONTROL OF EPISTAXIS;  Surgeon: Romelia Osler, MD;  Location: [facility];  Service: ENT      Family History:     History reviewed  No pertinent family history     Social History:     Social History     Socioeconomic History   • Marital status: /Civil Union     Spouse name: None   • Number of children: None   • Years of education: None   • Highest education level: None   Occupational History   • None   Tobacco Use   • Smoking status: Every Day     Packs/day: 2 00     Years: 6 00     Total pack years: 12 00     Types: Cigarettes   • Smokeless tobacco: Never   Substance and Sexual Activity   • Alcohol use: Never   • Drug use: Never   • Sexual activity: None   Other Topics Concern   • None   Social History Narrative   • None     Social Determinants of Health "    Financial Resource Strain: Not on file   Food Insecurity: Not on file   Transportation Needs: Not on file   Physical Activity: Not on file   Stress: Not on file   Social Connections: Not on file   Intimate Partner Violence: Not on file   Housing Stability: Not on file      Current Medications:     Current Outpatient Medications   Medication Sig Dispense Refill   • amLODIPine (NORVASC) 10 mg tablet        No current facility-administered medications for this visit  Allergies:     No Known Allergies   Physical Exam:     /72 (BP Location: Left arm, Patient Position: Sitting, Cuff Size: Standard)   Pulse 80   Temp 99 1 °F (37 3 °C)   Ht 5' 7\" (1 702 m)   Wt 98 9 kg (218 lb)   SpO2 95%   BMI 34 14 kg/m²     Physical Exam  Constitutional:       General: He is not in acute distress  Appearance: Normal appearance  He is not ill-appearing  HENT:      Head: Normocephalic and atraumatic  Right Ear: Tympanic membrane, ear canal and external ear normal       Left Ear: Tympanic membrane, ear canal and external ear normal       Nose: Nose normal       Mouth/Throat:      Mouth: Mucous membranes are moist       Pharynx: Oropharynx is clear  No oropharyngeal exudate or posterior oropharyngeal erythema  Eyes:      General: No scleral icterus  Right eye: No discharge  Left eye: No discharge  Extraocular Movements: Extraocular movements intact  Conjunctiva/sclera: Conjunctivae normal       Pupils: Pupils are equal, round, and reactive to light  Cardiovascular:      Rate and Rhythm: Normal rate and regular rhythm  Pulses: Normal pulses  Heart sounds: Normal heart sounds  No murmur heard  Pulmonary:      Effort: Pulmonary effort is normal  No respiratory distress  Breath sounds: Normal breath sounds  Abdominal:      General: Bowel sounds are normal       Palpations: Abdomen is soft  Tenderness: There is no abdominal tenderness     Musculoskeletal:         " General: Normal range of motion  Cervical back: Normal range of motion and neck supple  Lymphadenopathy:      Cervical: No cervical adenopathy  Skin:     General: Skin is warm and dry  Capillary Refill: Capillary refill takes less than 2 seconds  Neurological:      General: No focal deficit present  Mental Status: He is alert and oriented to person, place, and time  Mental status is at baseline  Cranial Nerves: No cranial nerve deficit     Psychiatric:         Mood and Affect: Mood normal           MD Lj Willett [id number] [place]

## 2023-06-16 ENCOUNTER — TELEPHONE (OUTPATIENT)
Dept: FAMILY MEDICINE CLINIC | Facility: CLINIC | Age: 48
End: 2023-06-16

## 2023-06-16 NOTE — TELEPHONE ENCOUNTER
T/c from pt -- has been having stomach pains, especially when he is hungry  It stops after he eats  Is concerned he has a bacteria  No appts to office pt on day of call, or on the following Monday  Pt will call for same day on Tuesday or go to urgent care  In meantime, would like a provider's advisement on what to do for the pain  Would like another provider advisement, as Dr Raman Childers is out of the office

## 2023-06-19 ENCOUNTER — APPOINTMENT (OUTPATIENT)
Dept: LAB | Facility: HOSPITAL | Age: 48
End: 2023-06-19
Payer: COMMERCIAL

## 2023-06-19 DIAGNOSIS — Z13.1 SCREENING FOR DIABETES MELLITUS: ICD-10-CM

## 2023-06-19 DIAGNOSIS — Z13.6 SCREENING FOR CARDIOVASCULAR CONDITION: ICD-10-CM

## 2023-06-19 DIAGNOSIS — Z12.5 SCREENING FOR MALIGNANT NEOPLASM OF PROSTATE: ICD-10-CM

## 2023-06-19 DIAGNOSIS — Z00.00 ANNUAL PHYSICAL EXAM: ICD-10-CM

## 2023-06-19 DIAGNOSIS — R79.89 LOW VITAMIN D LEVEL: ICD-10-CM

## 2023-06-19 LAB
25(OH)D3 SERPL-MCNC: 29.6 NG/ML (ref 30–100)
ALBUMIN SERPL BCP-MCNC: 4.6 G/DL (ref 3.5–5)
ALP SERPL-CCNC: 65 U/L (ref 34–104)
ALT SERPL W P-5'-P-CCNC: 25 U/L (ref 7–52)
ANION GAP SERPL CALCULATED.3IONS-SCNC: 6 MMOL/L (ref 4–13)
AST SERPL W P-5'-P-CCNC: 16 U/L (ref 13–39)
BILIRUB SERPL-MCNC: 0.66 MG/DL (ref 0.2–1)
BUN SERPL-MCNC: 16 MG/DL (ref 5–25)
CALCIUM SERPL-MCNC: 9.4 MG/DL (ref 8.4–10.2)
CHLORIDE SERPL-SCNC: 108 MMOL/L (ref 96–108)
CHOLEST SERPL-MCNC: 132 MG/DL
CO2 SERPL-SCNC: 24 MMOL/L (ref 21–32)
CREAT SERPL-MCNC: 0.78 MG/DL (ref 0.6–1.3)
ERYTHROCYTE [DISTWIDTH] IN BLOOD BY AUTOMATED COUNT: 12.7 % (ref 11.6–15.1)
EST. AVERAGE GLUCOSE BLD GHB EST-MCNC: 111 MG/DL
GFR SERPL CREATININE-BSD FRML MDRD: 105 ML/MIN/1.73SQ M
GLUCOSE P FAST SERPL-MCNC: 103 MG/DL (ref 65–99)
HBA1C MFR BLD: 5.5 %
HCT VFR BLD AUTO: 50.5 % (ref 36.5–49.3)
HDLC SERPL-MCNC: 41 MG/DL
HGB BLD-MCNC: 17.6 G/DL (ref 12–17)
LDLC SERPL CALC-MCNC: 78 MG/DL (ref 0–100)
MCH RBC QN AUTO: 31.6 PG (ref 26.8–34.3)
MCHC RBC AUTO-ENTMCNC: 34.9 G/DL (ref 31.4–37.4)
MCV RBC AUTO: 91 FL (ref 82–98)
NONHDLC SERPL-MCNC: 91 MG/DL
PLATELET # BLD AUTO: 212 THOUSANDS/UL (ref 149–390)
PMV BLD AUTO: 9.2 FL (ref 8.9–12.7)
POTASSIUM SERPL-SCNC: 3.9 MMOL/L (ref 3.5–5.3)
PROT SERPL-MCNC: 7.1 G/DL (ref 6.4–8.4)
PSA SERPL-MCNC: 1.16 NG/ML (ref 0–4)
RBC # BLD AUTO: 5.57 MILLION/UL (ref 3.88–5.62)
SODIUM SERPL-SCNC: 138 MMOL/L (ref 135–147)
TRIGL SERPL-MCNC: 63 MG/DL
TSH SERPL DL<=0.05 MIU/L-ACNC: 1.3 UIU/ML (ref 0.45–4.5)
WBC # BLD AUTO: 12.75 THOUSAND/UL (ref 4.31–10.16)

## 2023-06-19 PROCEDURE — 80053 COMPREHEN METABOLIC PANEL: CPT

## 2023-06-19 PROCEDURE — 36415 COLL VENOUS BLD VENIPUNCTURE: CPT

## 2023-06-19 PROCEDURE — 84443 ASSAY THYROID STIM HORMONE: CPT

## 2023-06-19 PROCEDURE — 83036 HEMOGLOBIN GLYCOSYLATED A1C: CPT

## 2023-06-19 PROCEDURE — G0103 PSA SCREENING: HCPCS

## 2023-06-19 PROCEDURE — 84402 ASSAY OF FREE TESTOSTERONE: CPT

## 2023-06-19 PROCEDURE — 82306 VITAMIN D 25 HYDROXY: CPT

## 2023-06-19 PROCEDURE — 85027 COMPLETE CBC AUTOMATED: CPT

## 2023-06-19 PROCEDURE — 84403 ASSAY OF TOTAL TESTOSTERONE: CPT

## 2023-06-19 PROCEDURE — 80061 LIPID PANEL: CPT

## 2023-06-20 ENCOUNTER — TELEPHONE (OUTPATIENT)
Dept: FAMILY MEDICINE CLINIC | Facility: CLINIC | Age: 48
End: 2023-06-20

## 2023-06-20 NOTE — TELEPHONE ENCOUNTER
Pt stopped by the office to ask for a phone # to schedule Colonoscopy  I gave pt [facility] phone # [phone] and our CS phone #  Pt will call to schedule  Pt aware Saul Patel is out of the office until 06/26/2023  Pt felt way more comfortable to stop by the office  Pt aware that he would need an appt to discuss at the visit  Notes he will be leaving for vacations as well  Pt asked me to sent a message directly to Dr Dawson instead  Pt requesting a referral to a GI doctor for evaluation to discuss possible ulcers / bacteria and endoscopy (as documented in message earlier)  Pt saw his bw results on mychart and is asking Dr Dawson to review those (notes WBC is little high)       Please advise

## 2023-06-21 LAB
TESTOST FREE SERPL-MCNC: 7 PG/ML (ref 6.8–21.5)
TESTOST SERPL-MCNC: 341 NG/DL (ref 264–916)

## 2023-06-26 ENCOUNTER — TELEPHONE (OUTPATIENT)
Dept: FAMILY MEDICINE CLINIC | Facility: CLINIC | Age: 48
End: 2023-06-26

## 2023-06-26 DIAGNOSIS — R79.89 LOW VITAMIN D LEVEL: Primary | ICD-10-CM

## 2023-06-26 RX ORDER — MELATONIN
1000 DAILY
Qty: 90 TABLET | Refills: 1 | Status: SHIPPED | OUTPATIENT
Start: 2023-06-26

## 2023-06-26 NOTE — TELEPHONE ENCOUNTER
----- Message from Amy Veliz sent at 6/26/2023 10:10 AM EDT -----  Spoke with pt  He is asking to prescribe the vit d  Please advise

## 2023-08-10 ENCOUNTER — TELEPHONE (OUTPATIENT)
Dept: OTHER | Facility: OTHER | Age: 48
End: 2023-08-10

## 2023-08-16 ENCOUNTER — TELEPHONE (OUTPATIENT)
Dept: FAMILY MEDICINE CLINIC | Facility: CLINIC | Age: 48
End: 2023-08-16

## 2023-08-16 DIAGNOSIS — R06.83 SNORING: Primary | ICD-10-CM

## 2023-08-16 DIAGNOSIS — R06.81 WITNESSED EPISODE OF APNEA: ICD-10-CM

## 2023-08-16 NOTE — TELEPHONE ENCOUNTER
T/c from pt - pt states he is always tired - he wakes up tired - says his testosterone level was low in his labs and this was discussed at his last appt - would really like to start something to help with this issue. Please advise.

## 2023-08-18 ENCOUNTER — TELEPHONE (OUTPATIENT)
Age: 48
End: 2023-08-18

## 2023-08-18 NOTE — TELEPHONE ENCOUNTER
Pt RCB - pt informed of sleep study ordered - pt said, he is NOT going for a sleep study - requested to speak with Dr Aarti Santamaria - [phone]. Pt was informed he is with patients.

## 2023-08-18 NOTE — TELEPHONE ENCOUNTER
Left message for pt to call office to schedule appt for ss consult with dr Willy Cardenas from referral workqueue

## 2023-08-21 NOTE — TELEPHONE ENCOUNTER
It was discussed at prior appointment and is the next step in evaluation of low energy and ,low testosterone. It is a contraindication to have untreated sleep apnea and be on testosterone replacement, so either step includes a sleep study.

## 2023-08-25 ENCOUNTER — OFFICE VISIT (OUTPATIENT)
Dept: OBGYN CLINIC | Facility: CLINIC | Age: 48
End: 2023-08-25
Payer: COMMERCIAL

## 2023-08-25 ENCOUNTER — OFFICE VISIT (OUTPATIENT)
Age: 48
End: 2023-08-25
Payer: COMMERCIAL

## 2023-08-25 ENCOUNTER — APPOINTMENT (OUTPATIENT)
Dept: RADIOLOGY | Facility: CLINIC | Age: 48
End: 2023-08-25
Payer: COMMERCIAL

## 2023-08-25 VITALS
BODY MASS INDEX: 34.75 KG/M2 | WEIGHT: 221.4 LBS | HEART RATE: 88 BPM | DIASTOLIC BLOOD PRESSURE: 76 MMHG | HEIGHT: 67 IN | SYSTOLIC BLOOD PRESSURE: 119 MMHG

## 2023-08-25 VITALS
HEIGHT: 67 IN | BODY MASS INDEX: 34.53 KG/M2 | OXYGEN SATURATION: 98 % | WEIGHT: 220 LBS | SYSTOLIC BLOOD PRESSURE: 118 MMHG | DIASTOLIC BLOOD PRESSURE: 80 MMHG | HEART RATE: 57 BPM

## 2023-08-25 DIAGNOSIS — M19.012 ARTHRITIS OF BOTH ACROMIOCLAVICULAR JOINTS: Primary | ICD-10-CM

## 2023-08-25 DIAGNOSIS — M25.811 IMPINGEMENT OF BOTH SHOULDERS: ICD-10-CM

## 2023-08-25 DIAGNOSIS — Z12.11 COLON CANCER SCREENING: ICD-10-CM

## 2023-08-25 DIAGNOSIS — M25.511 ACUTE PAIN OF BOTH SHOULDERS: ICD-10-CM

## 2023-08-25 DIAGNOSIS — M19.011 ARTHRITIS OF BOTH ACROMIOCLAVICULAR JOINTS: Primary | ICD-10-CM

## 2023-08-25 DIAGNOSIS — M75.82 TENDINITIS OF BOTH ROTATOR CUFFS: ICD-10-CM

## 2023-08-25 DIAGNOSIS — K92.1 MELENA: Primary | ICD-10-CM

## 2023-08-25 DIAGNOSIS — M25.812 IMPINGEMENT OF BOTH SHOULDERS: ICD-10-CM

## 2023-08-25 DIAGNOSIS — M25.512 ACUTE PAIN OF BOTH SHOULDERS: ICD-10-CM

## 2023-08-25 DIAGNOSIS — M75.81 TENDINITIS OF BOTH ROTATOR CUFFS: ICD-10-CM

## 2023-08-25 PROCEDURE — 99204 OFFICE O/P NEW MOD 45 MIN: CPT | Performed by: FAMILY MEDICINE

## 2023-08-25 PROCEDURE — 99204 OFFICE O/P NEW MOD 45 MIN: CPT | Performed by: PHYSICIAN ASSISTANT

## 2023-08-25 PROCEDURE — 20610 DRAIN/INJ JOINT/BURSA W/O US: CPT | Performed by: FAMILY MEDICINE

## 2023-08-25 PROCEDURE — 73030 X-RAY EXAM OF SHOULDER: CPT

## 2023-08-25 RX ORDER — PANTOPRAZOLE SODIUM 40 MG/1
40 TABLET, DELAYED RELEASE ORAL DAILY
Qty: 30 TABLET | Refills: 2 | Status: SHIPPED | OUTPATIENT
Start: 2023-08-25 | End: 2023-09-24

## 2023-08-25 RX ORDER — BUPIVACAINE HYDROCHLORIDE 2.5 MG/ML
4 INJECTION, SOLUTION INFILTRATION; PERINEURAL
Status: COMPLETED | OUTPATIENT
Start: 2023-08-25 | End: 2023-08-25

## 2023-08-25 RX ORDER — BUPIVACAINE HYDROCHLORIDE 2.5 MG/ML
1 INJECTION, SOLUTION INFILTRATION; PERINEURAL
Status: COMPLETED | OUTPATIENT
Start: 2023-08-25 | End: 2023-08-25

## 2023-08-25 RX ORDER — TRIAMCINOLONE ACETONIDE 40 MG/ML
40 INJECTION, SUSPENSION INTRA-ARTICULAR; INTRAMUSCULAR
Status: COMPLETED | OUTPATIENT
Start: 2023-08-25 | End: 2023-08-25

## 2023-08-25 RX ADMIN — TRIAMCINOLONE ACETONIDE 40 MG: 40 INJECTION, SUSPENSION INTRA-ARTICULAR; INTRAMUSCULAR at 15:00

## 2023-08-25 RX ADMIN — BUPIVACAINE HYDROCHLORIDE 1 ML: 2.5 INJECTION, SOLUTION INFILTRATION; PERINEURAL at 15:00

## 2023-08-25 RX ADMIN — BUPIVACAINE HYDROCHLORIDE 4 ML: 2.5 INJECTION, SOLUTION INFILTRATION; PERINEURAL at 15:00

## 2023-08-25 NOTE — PROGRESS NOTES
[facility] - Outpatient Consultation  Frank Cm 51 y.o. male MRN: [id number]  Encounter: [id number]          ASSESSMENT AND PLAN:      1. Melena    Patient reports intermittent episodes of intermittent epigastric discomfort and melena over the past year - last episode was a couple of months ago. He reports a grandmother with stomach cancer. Recent CBC was negative for anemia. Will plan for EGD to investigate for gastritis, PUD, rule out malignancy, etc and biopsy for h pylori. Suspect PUD. Will start a course of Pantoprazole 40mg po daily. Smoking cessation recommended. 2. Colon cancer screening    Patient has never had a colonoscopy. No family history of colon cancer. Will plan for colonoscopy at the same time as the EGD.    ______________________________________________________________________    HPI:  Patient is a pleasant 51year old male who presents to the office to schedule an EGD and colonoscopy. Patient reports that he has had a couple episodes of intermittent melena and epigastric discomfort that would be alleviated by eating over the past year. Episodes lasted about a week or so. His last episode was about a couple of months ago. No frequent NSAIDs. No vomiting. No prior EGD. He is a smoker but working on quitting. No rectal bleeding. He has never had a colonoscopy. No family history of colon cancer. He reports a grandmother with stomach cancer. REVIEW OF SYSTEMS:    CONSTITUTIONAL: Denies any fever, chills, rigors, and weight loss. HEENT: No earache or tinnitus. Denies hearing loss or visual disturbances. CARDIOVASCULAR: No chest pain or palpitations. RESPIRATORY: Denies any cough, hemoptysis, shortness of breath or dyspnea on exertion. GASTROINTESTINAL: As noted in the History of Present Illness. GENITOURINARY: No problems with urination. Denies any hematuria or dysuria. NEUROLOGIC: No dizziness or vertigo, denies headaches. MUSCULOSKELETAL: Denies any muscle or joint pain. SKIN: Denies skin rashes or itching. ENDOCRINE: Denies excessive thirst. Denies intolerance to heat or cold. PSYCHOSOCIAL: Denies depression or anxiety. Denies any recent memory loss. Historical Information   Past Medical History:   Diagnosis Date   • Ear problems    • Nosebleed      Past Surgical History:   Procedure Laterality Date   • [place] NASAL/SINUS NDSC SURG W/CONTROL NASAL HEMRRG N/A 10/21/2022    Procedure: NASAL ENDOSCOPY WITH CONTROL OF EPISTAXIS;  Surgeon: Fito Nelson MD;  Location: [facility];  Service: ENT     Social History   Social History     Substance and Sexual Activity   Alcohol Use Not Currently     Social History     Substance and Sexual Activity   Drug Use Never     Social History     Tobacco Use   Smoking Status Every Day   • Packs/day: 2.00   • Years: 5.00   • Total pack years: 10.00   • Types: Cigarettes   Smokeless Tobacco Never     History reviewed. No pertinent family history. Meds/Allergies       Current Outpatient Medications:   •  amLODIPine (NORVASC) 10 mg tablet  •  cholecalciferol (VITAMIN D3) 1,000 units tablet  •  pantoprazole (PROTONIX) 40 mg tablet    No Known Allergies        Objective     Blood pressure 118/80, pulse 57, height 5' 7" (1.702 m), weight 99.8 kg (220 lb), SpO2 98 %. Body mass index is 34.46 kg/m². PHYSICAL EXAM:      General Appearance:   Alert, cooperative, no distress   HEENT:   Normocephalic, atraumatic, anicteric.     Neck:  Supple, symmetrical, trachea midline   Lungs:   Clear to auscultation bilaterally; no rales, rhonchi or wheezing; respirations unlabored    Heart[de-identified]   Regular rate and rhythm; no murmur, rub, or gallop.    Abdomen:   Soft, non-tender, non-distended; normal bowel sounds; no masses, no organomegaly    Genitalia:   Deferred    Rectal:   Deferred    Extremities:  No cyanosis, clubbing or edema    Pulses:  2+ and symmetric    Skin:  No jaundice, rashes, or lesions    Lymph nodes:  No palpable cervical lymphadenopathy        Lab Results:   No visits with results within 1 Day(s) from this visit. Latest known visit with results is:   Appointment on 06/19/2023   Component Date Value   • Cholesterol 06/19/2023 132    • Triglycerides 06/19/2023 63    • HDL, Direct 06/19/2023 41    • LDL Calculated 06/19/2023 78    • Non-HDL-Chol (CHOL-HDL) 06/19/2023 91    • Hemoglobin A1C 06/19/2023 5.5    • EAG 06/19/2023 111    • WBC 06/19/2023 12.75 (H)    • RBC 06/19/2023 5.57    • Hemoglobin 06/19/2023 17.6 (H)    • Hematocrit 06/19/2023 50.5 (H)    • MCV 06/19/2023 91    • MCH 06/19/2023 31.6    • MCHC 06/19/2023 34.9    • RDW 06/19/2023 12.7    • Platelets 94/60/0224 212    • MPV 06/19/2023 9.2    • Sodium 06/19/2023 138    • Potassium 06/19/2023 3.9    • Chloride 06/19/2023 108    • CO2 06/19/2023 24    • ANION GAP 06/19/2023 6    • BUN 06/19/2023 16    • Creatinine 06/19/2023 0.78    • Glucose, Fasting 06/19/2023 103 (H)    • Calcium 06/19/2023 9.4    • AST 06/19/2023 16    • ALT 06/19/2023 25    • Alkaline Phosphatase 06/19/2023 65    • Total Protein 06/19/2023 7.1    • Albumin 06/19/2023 4.6    • Total Bilirubin 06/19/2023 0.66    • eGFR 06/19/2023 105    • TSH 3RD GENERATON 06/19/2023 1.298    • Vit D, 25-Hydroxy 06/19/2023 29.6 (L)    • Testosterone, Free 06/19/2023 7.0    • TESTOSTERONE TOTAL 06/19/2023 341    • PSA 06/19/2023 1.16          Radiology Results:   No results found.

## 2023-08-25 NOTE — PROGRESS NOTES
Assessment/Plan:  Assessment/Plan   Diagnoses and all orders for this visit:    Arthritis of both acromioclavicular joints  -     XR shoulder 2+ vw left; Future  -     XR shoulder 2+ vw right; Future  -     Ambulatory Referral to Physical Therapy; Future    Tendinitis of both rotator cuffs  -     Ambulatory Referral to Physical Therapy; Future  -     Large joint arthrocentesis: bilateral subacromial bursa    Impingement of both shoulders  -     Ambulatory Referral to Physical Therapy; Future      40-year-old right-hand-dominant male  with pain both shoulders more than 6 months duration. Discussed with patient physical exam, radiographs, impression, and plan. X-rays both shoulders noted for mild AC joint degenerative changes. Physical exam cervical spine is unremarkable for midline or paraspinal tenderness. He has intact range of motion cervical spine. Axial load and Spurling's are unremarkable. There is no bony or soft tissue tenderness of the shoulders. Left shoulder has range of motion forward flexion to 170 degrees and abduction to 160 degrees, however done slowly with internal rotation to the lumbar spine. Right shoulder has range of motion forward flexion to 170 degrees, abduction 150 degrees, however done slowly with internal rotation to the lumbar spine. He has 4+/5 strength supraspinatus both shoulders. There is pain with empty can test both shoulders and positive Ocampo both shoulders. He has normal sensation and radial pulse both upper extremities. Clinical impression is that he has symptoms of aggravated AC arthritis and tendinitis both rotator cuffs. I discussed treatment regimen of steroid injection, supplements, and formal therapy. Surgery is not warranted. I administered mixtures of 3 cc 0.25% bupivacaine and 1 cc Kenalog to the subacromial space both shoulders without complication.   He is to take turmeric at least 1000 mg daily, tart cherry at least 1000 mg daily, and glucosamine 2-3 times a day. He is to start formal therapy as soon as possible and do home exercises as directed. He will follow-up as needed. Subjective:   Patient ID: Tierra Cooper is a 51 y.o. male. Chief Complaint   Patient presents with   • Right Shoulder - Pain   • Left Shoulder - Pain       62-year-old right-hand-dominant male  presents for evaluation pain both shoulders more than 6 months duration. Pain described as gradual in onset, generalized to the shoulder worse at the anterior aspect, nonradiating, burning and sometimes sharp, worse with lifting above shoulder level, associate with limited range of motion, and improved with resting. He does not usually take medication for symptoms. He sometimes applies topical Biofreeze to help with symptoms. Shoulder Pain  This is a new problem. The current episode started more than 1 month ago. The problem occurs daily. The problem has been gradually worsening. Associated symptoms include arthralgias. Pertinent negatives include no abdominal pain, chest pain, chills, fever, joint swelling, numbness, rash, sore throat or weakness. Exacerbated by: Arm movement. He has tried rest and position changes (Topical anesthetic) for the symptoms. The treatment provided mild relief. The following portions of the patient's history were reviewed and updated as appropriate: He  has a past medical history of Ear problems and Nosebleed. He has No Known Allergies. .    Review of Systems   Constitutional: Negative for chills and fever. HENT: Negative for sore throat. Eyes: Negative for visual disturbance. Respiratory: Negative for shortness of breath. Cardiovascular: Negative for chest pain. Gastrointestinal: Negative for abdominal pain. Genitourinary: Negative for flank pain. Musculoskeletal: Positive for arthralgias. Negative for joint swelling. Skin: Negative for rash and wound.    Neurological: Negative for weakness and numbness. Hematological: Does not bruise/bleed easily. Psychiatric/Behavioral: Negative for self-injury. Objective:  Vitals:    08/25/23 1443   BP: 119/76   Pulse: 88   Weight: 100 kg (221 lb 6.4 oz)   Height: 5' 7" (1.702 m)     Back Exam     Reflexes   Biceps: normal    Comments:    Cervical spine  - No tenderness  - Normal range of motion  - Negative axial load  - Negative Spurling's  - Normal sensation and bicep reflex both upper extremities      Right Hand Exam     Muscle Strength   The patient has normal right wrist strength. Other   Sensation: normal  Pulse: present      Left Hand Exam     Muscle Strength   The patient has normal left wrist strength. Other   Sensation: normal  Pulse: present      Right Shoulder Exam     Tenderness   The patient is experiencing no tenderness. Range of Motion   Active abduction: 150   Forward flexion: 170   Internal rotation 0 degrees: Lumbar     Muscle Strength   Abduction: 5/5   Internal rotation: 5/5   External rotation: 5/5     Tests   Ocampo test: positive    Comments:  4+/5 supraspinatus  Pain with empty can test      Left Shoulder Exam     Tenderness   The patient is experiencing no tenderness. Range of Motion   Active abduction: 160   Forward flexion: 170   Internal rotation 0 degrees: Lumbar     Muscle Strength   Abduction: 5/5   Internal rotation: 5/5   External rotation: 5/5     Tests   Ocampo test: positive    Comments:  4+/5 supraspinatus  Pain with empty can test          Strength/Myotome Testing     Left Wrist/Hand   Normal wrist strength    Right Wrist/Hand   Normal wrist strength      Physical Exam  Vitals and nursing note reviewed. Constitutional:       General: He is not in acute distress. Appearance: He is well-developed. He is not ill-appearing or diaphoretic. HENT:      Head: Normocephalic and atraumatic.       Right Ear: External ear normal.      Left Ear: External ear normal.   Eyes:      Conjunctiva/sclera: Conjunctivae normal.   Neck:      Trachea: No tracheal deviation. Cardiovascular:      Rate and Rhythm: Normal rate. Pulmonary:      Effort: Pulmonary effort is normal. No respiratory distress. Abdominal:      General: There is no distension. Musculoskeletal:         General: No swelling, tenderness, deformity or signs of injury. Skin:     General: Skin is warm and dry. Coloration: Skin is not jaundiced or pale. Neurological:      Mental Status: He is alert and oriented to person, place, and time. Psychiatric:         Mood and Affect: Mood normal.         Behavior: Behavior normal.         Thought Content: Thought content normal.         Judgment: Judgment normal.         I have personally reviewed pertinent films in PACS and my interpretation is AC joint degenerative changes both shoulders. Large joint arthrocentesis: bilateral subacromial bursa  Universal Protocol:  Consent: Verbal consent obtained. Risks and benefits: risks, benefits and alternatives were discussed  Consent given by: patient  Time out: Immediately prior to procedure a "time out" was called to verify the correct patient, procedure, equipment, support staff and site/side marked as required. Patient understanding: patient states understanding of the procedure being performed  Patient consent: the patient's understanding of the procedure matches consent given  Procedure consent: procedure consent matches procedure scheduled  Relevant documents: relevant documents present and verified  Test results: test results available and properly labeled  Site marked: the operative site was marked  Radiology Images displayed and confirmed.  If images not available, report reviewed: imaging studies available  Required items: required blood products, implants, devices, and special equipment available  Patient identity confirmed: verbally with patient    Supporting Documentation  Indications: pain   Procedure Details  Location: shoulder - bilateral subacromial bursa  Preparation: Patient was prepped and draped in the usual sterile fashion  Needle gauge: 21G 2"  Ultrasound guidance: no  Approach: lateral    Medications (Right): 4 mL bupivacaine 0.25 %; 1 mL bupivacaine 0.25 %; 40 mg triamcinolone acetonide 40 mg/mLMedications (Left): 1 mL bupivacaine 0.25 %; 4 mL bupivacaine 0.25 %; 40 mg triamcinolone acetonide 40 mg/mL   Patient tolerance: patient tolerated the procedure well with no immediate complications  Dressing:  Sterile dressing applied

## 2023-08-25 NOTE — PATIENT INSTRUCTIONS
Scheduled date of EGD/colonoscopy (as of today): 9/12/23  Physician performing EGD/colonoscopy: Jasmyn  Location of EGD/colonoscopy: [facility]  Desired bowel prep reviewed with patient: Miralax  Instructions reviewed with patient by: Jonelle HOLLAND  Clearances:

## 2023-08-25 NOTE — H&P (VIEW-ONLY)
[facility] - Outpatient Consultation  Frank Cm 51 y.o. male MRN: [id number]  Encounter: [id number]          ASSESSMENT AND PLAN:      1. Melena    Patient reports intermittent episodes of intermittent epigastric discomfort and melena over the past year - last episode was a couple of months ago. He reports a grandmother with stomach cancer. Recent CBC was negative for anemia. Will plan for EGD to investigate for gastritis, PUD, rule out malignancy, etc and biopsy for h pylori. Suspect PUD. Will start a course of Pantoprazole 40mg po daily. Smoking cessation recommended. 2. Colon cancer screening    Patient has never had a colonoscopy. No family history of colon cancer. Will plan for colonoscopy at the same time as the EGD.    ______________________________________________________________________    HPI:  Patient is a pleasant 51year old male who presents to the office to schedule an EGD and colonoscopy. Patient reports that he has had a couple episodes of intermittent melena and epigastric discomfort that would be alleviated by eating over the past year. Episodes lasted about a week or so. His last episode was about a couple of months ago. No frequent NSAIDs. No vomiting. No prior EGD. He is a smoker but working on quitting. No rectal bleeding. He has never had a colonoscopy. No family history of colon cancer. He reports a grandmother with stomach cancer. REVIEW OF SYSTEMS:    CONSTITUTIONAL: Denies any fever, chills, rigors, and weight loss. HEENT: No earache or tinnitus. Denies hearing loss or visual disturbances. CARDIOVASCULAR: No chest pain or palpitations. RESPIRATORY: Denies any cough, hemoptysis, shortness of breath or dyspnea on exertion. GASTROINTESTINAL: As noted in the History of Present Illness. GENITOURINARY: No problems with urination. Denies any hematuria or dysuria. NEUROLOGIC: No dizziness or vertigo, denies headaches. MUSCULOSKELETAL: Denies any muscle or joint pain. SKIN: Denies skin rashes or itching. ENDOCRINE: Denies excessive thirst. Denies intolerance to heat or cold. PSYCHOSOCIAL: Denies depression or anxiety. Denies any recent memory loss. Historical Information   Past Medical History:   Diagnosis Date   • Ear problems    • Nosebleed      Past Surgical History:   Procedure Laterality Date   • [place] NASAL/SINUS NDSC SURG W/CONTROL NASAL HEMRRG N/A 10/21/2022    Procedure: NASAL ENDOSCOPY WITH CONTROL OF EPISTAXIS;  Surgeon: Clifford Gillis MD;  Location: [facility];  Service: ENT     Social History   Social History     Substance and Sexual Activity   Alcohol Use Not Currently     Social History     Substance and Sexual Activity   Drug Use Never     Social History     Tobacco Use   Smoking Status Every Day   • Packs/day: 2.00   • Years: 5.00   • Total pack years: 10.00   • Types: Cigarettes   Smokeless Tobacco Never     History reviewed. No pertinent family history. Meds/Allergies       Current Outpatient Medications:   •  amLODIPine (NORVASC) 10 mg tablet  •  cholecalciferol (VITAMIN D3) 1,000 units tablet  •  pantoprazole (PROTONIX) 40 mg tablet    No Known Allergies        Objective     Blood pressure 118/80, pulse 57, height 5' 7" (1.702 m), weight 99.8 kg (220 lb), SpO2 98 %. Body mass index is 34.46 kg/m². PHYSICAL EXAM:      General Appearance:   Alert, cooperative, no distress   HEENT:   Normocephalic, atraumatic, anicteric.     Neck:  Supple, symmetrical, trachea midline   Lungs:   Clear to auscultation bilaterally; no rales, rhonchi or wheezing; respirations unlabored    Heart[de-identified]   Regular rate and rhythm; no murmur, rub, or gallop.    Abdomen:   Soft, non-tender, non-distended; normal bowel sounds; no masses, no organomegaly    Genitalia:   Deferred    Rectal:   Deferred    Extremities:  No cyanosis, clubbing or edema    Pulses:  2+ and symmetric    Skin:  No jaundice, rashes, or lesions    Lymph nodes:  No palpable cervical lymphadenopathy        Lab Results:   No visits with results within 1 Day(s) from this visit. Latest known visit with results is:   Appointment on 06/19/2023   Component Date Value   • Cholesterol 06/19/2023 132    • Triglycerides 06/19/2023 63    • HDL, Direct 06/19/2023 41    • LDL Calculated 06/19/2023 78    • Non-HDL-Chol (CHOL-HDL) 06/19/2023 91    • Hemoglobin A1C 06/19/2023 5.5    • EAG 06/19/2023 111    • WBC 06/19/2023 12.75 (H)    • RBC 06/19/2023 5.57    • Hemoglobin 06/19/2023 17.6 (H)    • Hematocrit 06/19/2023 50.5 (H)    • MCV 06/19/2023 91    • MCH 06/19/2023 31.6    • MCHC 06/19/2023 34.9    • RDW 06/19/2023 12.7    • Platelets 26/68/9063 212    • MPV 06/19/2023 9.2    • Sodium 06/19/2023 138    • Potassium 06/19/2023 3.9    • Chloride 06/19/2023 108    • CO2 06/19/2023 24    • ANION GAP 06/19/2023 6    • BUN 06/19/2023 16    • Creatinine 06/19/2023 0.78    • Glucose, Fasting 06/19/2023 103 (H)    • Calcium 06/19/2023 9.4    • AST 06/19/2023 16    • ALT 06/19/2023 25    • Alkaline Phosphatase 06/19/2023 65    • Total Protein 06/19/2023 7.1    • Albumin 06/19/2023 4.6    • Total Bilirubin 06/19/2023 0.66    • eGFR 06/19/2023 105    • TSH 3RD GENERATON 06/19/2023 1.298    • Vit D, 25-Hydroxy 06/19/2023 29.6 (L)    • Testosterone, Free 06/19/2023 7.0    • TESTOSTERONE TOTAL 06/19/2023 341    • PSA 06/19/2023 1.16          Radiology Results:   No results found.

## 2023-09-01 ENCOUNTER — TELEPHONE (OUTPATIENT)
Age: 48
End: 2023-09-01

## 2023-09-01 NOTE — TELEPHONE ENCOUNTER
Called pt to schedule ss consult   Pt states he does not want one   Pt states prescribing doctor who referred did not ask him before referring    He would like to not be called again about scheduling   Delete referral

## 2023-09-12 ENCOUNTER — ANESTHESIA EVENT (OUTPATIENT)
Dept: GASTROENTEROLOGY | Facility: HOSPITAL | Age: 48
End: 2023-09-12

## 2023-09-12 ENCOUNTER — ANESTHESIA (OUTPATIENT)
Dept: GASTROENTEROLOGY | Facility: HOSPITAL | Age: 48
End: 2023-09-12

## 2023-09-12 ENCOUNTER — HOSPITAL ENCOUNTER (OUTPATIENT)
Dept: GASTROENTEROLOGY | Facility: HOSPITAL | Age: 48
Setting detail: OUTPATIENT SURGERY
Discharge: HOME | End: 2023-09-12
Payer: COMMERCIAL

## 2023-09-12 VITALS
BODY MASS INDEX: 33.67 KG/M2 | HEART RATE: 76 BPM | WEIGHT: 214.51 LBS | DIASTOLIC BLOOD PRESSURE: 79 MMHG | TEMPERATURE: 97.9 F | OXYGEN SATURATION: 97 % | SYSTOLIC BLOOD PRESSURE: 120 MMHG | RESPIRATION RATE: 13 BRPM | HEIGHT: 67 IN

## 2023-09-12 DIAGNOSIS — Z12.11 COLON CANCER SCREENING: ICD-10-CM

## 2023-09-12 DIAGNOSIS — K92.1 MELENA: ICD-10-CM

## 2023-09-12 PROCEDURE — 88341 IMHCHEM/IMCYTCHM EA ADD ANTB: CPT | Performed by: PATHOLOGY

## 2023-09-12 PROCEDURE — 88305 TISSUE EXAM BY PATHOLOGIST: CPT | Performed by: PATHOLOGY

## 2023-09-12 PROCEDURE — 88342 IMHCHEM/IMCYTCHM 1ST ANTB: CPT | Performed by: PATHOLOGY

## 2023-09-12 RX ORDER — PROPOFOL 10 MG/ML
INJECTION, EMULSION INTRAVENOUS AS NEEDED
Status: DISCONTINUED | OUTPATIENT
Start: 2023-09-12 | End: 2023-09-12

## 2023-09-12 RX ORDER — LIDOCAINE HYDROCHLORIDE 10 MG/ML
0.5 INJECTION, SOLUTION EPIDURAL; INFILTRATION; INTRACAUDAL; PERINEURAL ONCE AS NEEDED
Status: DISCONTINUED | OUTPATIENT
Start: 2023-09-12 | End: 2023-09-16 | Stop reason: HOSPADM

## 2023-09-12 RX ORDER — LIDOCAINE HYDROCHLORIDE 10 MG/ML
0.5 INJECTION, SOLUTION EPIDURAL; INFILTRATION; INTRACAUDAL; PERINEURAL ONCE AS NEEDED
Status: CANCELLED | OUTPATIENT
Start: 2023-09-12

## 2023-09-12 RX ORDER — LIDOCAINE HYDROCHLORIDE 20 MG/ML
INJECTION, SOLUTION EPIDURAL; INFILTRATION; INTRACAUDAL; PERINEURAL AS NEEDED
Status: DISCONTINUED | OUTPATIENT
Start: 2023-09-12 | End: 2023-09-12

## 2023-09-12 RX ORDER — SODIUM CHLORIDE, SODIUM LACTATE, POTASSIUM CHLORIDE, CALCIUM CHLORIDE 600; 310; 30; 20 MG/100ML; MG/100ML; MG/100ML; MG/100ML
50 INJECTION, SOLUTION INTRAVENOUS CONTINUOUS
Status: CANCELLED | OUTPATIENT
Start: 2023-09-12

## 2023-09-12 RX ORDER — SODIUM CHLORIDE, SODIUM LACTATE, POTASSIUM CHLORIDE, CALCIUM CHLORIDE 600; 310; 30; 20 MG/100ML; MG/100ML; MG/100ML; MG/100ML
INJECTION, SOLUTION INTRAVENOUS CONTINUOUS PRN
Status: DISCONTINUED | OUTPATIENT
Start: 2023-09-12 | End: 2023-09-12

## 2023-09-12 RX ORDER — SODIUM CHLORIDE, SODIUM LACTATE, POTASSIUM CHLORIDE, CALCIUM CHLORIDE 600; 310; 30; 20 MG/100ML; MG/100ML; MG/100ML; MG/100ML
50 INJECTION, SOLUTION INTRAVENOUS CONTINUOUS
Status: DISCONTINUED | OUTPATIENT
Start: 2023-09-12 | End: 2023-09-16 | Stop reason: HOSPADM

## 2023-09-12 RX ADMIN — PROPOFOL 30 MG: 10 INJECTION, EMULSION INTRAVENOUS at 08:52

## 2023-09-12 RX ADMIN — LIDOCAINE HYDROCHLORIDE 100 MG: 20 INJECTION, SOLUTION EPIDURAL; INFILTRATION; INTRACAUDAL; PERINEURAL at 08:45

## 2023-09-12 RX ADMIN — SODIUM CHLORIDE, SODIUM LACTATE, POTASSIUM CHLORIDE, AND CALCIUM CHLORIDE: .6; .31; .03; .02 INJECTION, SOLUTION INTRAVENOUS at 08:40

## 2023-09-12 RX ADMIN — PROPOFOL 50 MG: 10 INJECTION, EMULSION INTRAVENOUS at 08:46

## 2023-09-12 RX ADMIN — SODIUM CHLORIDE, SODIUM LACTATE, POTASSIUM CHLORIDE, AND CALCIUM CHLORIDE 50 ML/HR: .6; .31; .03; .02 INJECTION, SOLUTION INTRAVENOUS at 08:10

## 2023-09-12 RX ADMIN — PROPOFOL 50 MG: 10 INJECTION, EMULSION INTRAVENOUS at 08:48

## 2023-09-12 RX ADMIN — PROPOFOL 20 MG: 10 INJECTION, EMULSION INTRAVENOUS at 08:56

## 2023-09-12 RX ADMIN — PROPOFOL 100 MG: 10 INJECTION, EMULSION INTRAVENOUS at 08:45

## 2023-09-12 RX ADMIN — PROPOFOL 50 MG: 10 INJECTION, EMULSION INTRAVENOUS at 08:47

## 2023-09-12 NOTE — ANESTHESIA POSTPROCEDURE EVALUATION
Post-Op Assessment Note    CV Status:  Stable  Pain Score: 0    Pain management: adequate     Mental Status:  Alert and awake   Hydration Status:  Euvolemic   PONV Controlled:  Controlled   Airway Patency:  Patent   Two or more mitigation strategies used for obstructive sleep apnea   Post Op Vitals Reviewed: Yes      Staff: CRNA         No notable events documented.     /64 (09/12/23 0909)    Temp 97.9 °F (36.6 °C) (09/12/23 0909)    Pulse 92 (09/12/23 0909)   Resp 22 (09/12/23 0909)    SpO2 96 % (09/12/23 0909)

## 2023-09-12 NOTE — INTERVAL H&P NOTE
H&P reviewed. After examining the patient I find no changes in the patients condition since the H&P had been written.     Vitals:    09/12/23 0805   BP: 134/88   Pulse: 93   Resp: 19   Temp: 98.5 °F (36.9 °C)   SpO2: 96%

## 2023-09-12 NOTE — ANESTHESIA PREPROCEDURE EVALUATION
Procedure:  EGD  COLONOSCOPY    Relevant Problems   CARDIO   (+) Hypertension      Other   (+) Class 1 obesity with body mass index (BMI) of 32.0 to 32.9 in adult   (+) Tobacco dependence      Denies recent fever, cough or other symptom of upper respiratory tract infection. Confirmed NPO appropriate    Physical Exam    Airway    Mallampati score: III  TM Distance: >3 FB  Neck ROM: full     Dental   No notable dental hx     Cardiovascular      Pulmonary      Other Findings        Anesthesia Plan  ASA Score- 2     Anesthesia Type- IV sedation with anesthesia with ASA Monitors. Additional Monitors:   Airway Plan:     Comment: I discussed the risks and benefits of IV sedation anesthesia including the possibility of the need to convert to general anesthesia and the potential risk of awareness. Plan Factors-Exercise tolerance (METS): >4 METS. Exercise comment: Able to climb two flights of stairs without cardiopulmonary limitation. Chart reviewed. EKG reviewed. Existing labs reviewed. Patient is a current smoker. Induction- intravenous. Postoperative Plan-     Informed Consent- Anesthetic plan and risks discussed with patient.

## 2023-09-19 DIAGNOSIS — K92.1 MELENA: ICD-10-CM

## 2023-09-19 PROCEDURE — 88341 IMHCHEM/IMCYTCHM EA ADD ANTB: CPT | Performed by: PATHOLOGY

## 2023-09-19 PROCEDURE — 88305 TISSUE EXAM BY PATHOLOGIST: CPT | Performed by: PATHOLOGY

## 2023-09-19 PROCEDURE — 88342 IMHCHEM/IMCYTCHM 1ST ANTB: CPT | Performed by: PATHOLOGY

## 2023-09-19 RX ORDER — PANTOPRAZOLE SODIUM 40 MG/1
40 TABLET, DELAYED RELEASE ORAL DAILY
Qty: 90 TABLET | Refills: 1 | Status: SHIPPED | OUTPATIENT
Start: 2023-09-19

## 2023-11-27 ENCOUNTER — OFFICE VISIT (OUTPATIENT)
Dept: OBGYN CLINIC | Facility: CLINIC | Age: 48
End: 2023-11-27
Payer: COMMERCIAL

## 2023-11-27 VITALS
HEART RATE: 114 BPM | DIASTOLIC BLOOD PRESSURE: 86 MMHG | BODY MASS INDEX: 35.31 KG/M2 | WEIGHT: 225 LBS | HEIGHT: 67 IN | SYSTOLIC BLOOD PRESSURE: 124 MMHG

## 2023-11-27 DIAGNOSIS — M25.811 IMPINGEMENT OF BOTH SHOULDERS: ICD-10-CM

## 2023-11-27 DIAGNOSIS — M19.012 ARTHRITIS OF BOTH ACROMIOCLAVICULAR JOINTS: ICD-10-CM

## 2023-11-27 DIAGNOSIS — M25.812 IMPINGEMENT OF BOTH SHOULDERS: ICD-10-CM

## 2023-11-27 DIAGNOSIS — M75.82 TENDINITIS OF BOTH ROTATOR CUFFS: Primary | ICD-10-CM

## 2023-11-27 DIAGNOSIS — M75.81 TENDINITIS OF BOTH ROTATOR CUFFS: Primary | ICD-10-CM

## 2023-11-27 DIAGNOSIS — M19.011 ARTHRITIS OF BOTH ACROMIOCLAVICULAR JOINTS: ICD-10-CM

## 2023-11-27 PROCEDURE — 20610 DRAIN/INJ JOINT/BURSA W/O US: CPT | Performed by: FAMILY MEDICINE

## 2023-11-27 PROCEDURE — 99213 OFFICE O/P EST LOW 20 MIN: CPT | Performed by: FAMILY MEDICINE

## 2023-11-27 RX ORDER — TRIAMCINOLONE ACETONIDE 40 MG/ML
40 INJECTION, SUSPENSION INTRA-ARTICULAR; INTRAMUSCULAR
Status: COMPLETED | OUTPATIENT
Start: 2023-11-27 | End: 2023-11-27

## 2023-11-27 RX ORDER — BUPIVACAINE HYDROCHLORIDE 2.5 MG/ML
1 INJECTION, SOLUTION INFILTRATION; PERINEURAL
Status: COMPLETED | OUTPATIENT
Start: 2023-11-27 | End: 2023-11-27

## 2023-11-27 RX ORDER — BUPIVACAINE HYDROCHLORIDE 2.5 MG/ML
4 INJECTION, SOLUTION INFILTRATION; PERINEURAL
Status: COMPLETED | OUTPATIENT
Start: 2023-11-27 | End: 2023-11-27

## 2023-11-27 RX ORDER — MELOXICAM 15 MG/1
15 TABLET ORAL DAILY
Qty: 30 TABLET | Refills: 1 | Status: SHIPPED | OUTPATIENT
Start: 2023-11-27

## 2023-11-27 RX ORDER — ATENOLOL 25 MG/1
25 TABLET ORAL DAILY
COMMUNITY
Start: 2023-08-28

## 2023-11-27 RX ADMIN — TRIAMCINOLONE ACETONIDE 40 MG: 40 INJECTION, SUSPENSION INTRA-ARTICULAR; INTRAMUSCULAR at 08:15

## 2023-11-27 RX ADMIN — BUPIVACAINE HYDROCHLORIDE 1 ML: 2.5 INJECTION, SOLUTION INFILTRATION; PERINEURAL at 08:15

## 2023-11-27 RX ADMIN — BUPIVACAINE HYDROCHLORIDE 4 ML: 2.5 INJECTION, SOLUTION INFILTRATION; PERINEURAL at 08:15

## 2023-11-27 NOTE — PROGRESS NOTES
Assessment/Plan:  Assessment/Plan   Diagnoses and all orders for this visit:    Tendinitis of both rotator cuffs  -     Ambulatory Referral to Physical Therapy; Future  -     Large joint arthrocentesis: bilateral subacromial bursa    Impingement of both shoulders  -     Ambulatory Referral to Physical Therapy; Future    Arthritis of both acromioclavicular joints  -     Ambulatory Referral to Physical Therapy; Future  -     meloxicam (Mobic) 15 mg tablet; Take 1 tablet (15 mg total) by mouth daily    Other orders  -     atenolol (TENORMIN) 25 mg tablet; Take 25 mg by mouth daily      19-year-old right-hand-dominant male  with pain both shoulders more than 9 months duration. Clinical impression is that he has symptoms from combination of AC joint arthritis and rotator cuff tendinopathy. I discussed treatment regimen of steroid injection, anti-inflammatory, and formal therapy. I administered mixtures of 3 cc 0.25% bupivacaine and 1 cc Kenalog to the subacromial space both shoulders without complication. He is to take meloxicam 15 mg once daily with food for 30 days and not to take ibuprofen or Aleve, but may take Tylenol. He is to start physical therapy as soon as possible and do home exercises as directed. He will follow-up as needed. Subjective:   Patient ID: Panda [place] is a 51 y.o. male. Chief Complaint   Patient presents with    Right Shoulder - Pain, Follow-up    Left Shoulder - Pain, Follow-up        19-year-old right-hand-dominant male  following up for pain both shoulders more than 9 months duration. He was last seen by me 3 months ago at which point he was given steroid injection to both shoulders and referred to formal therapy. He states that due to work scheduling challenges he has not been able to attend formal therapy. He reports that following steroid injection he had improved pain that lasted for about 1 month.   Since that time pain in both shoulders have been gradually worsening. He has pain described as generalized to the shoulders, nonradiating, worse with activity particular elevating above shoulder level, associated with limited range of motion, and improved with resting. He does not usually take medication for symptoms. Shoulder Pain  This is a new problem. The current episode started more than 1 month ago. The problem occurs daily. The problem has been gradually worsening. Associated symptoms include arthralgias. Pertinent negatives include no joint swelling, numbness or weakness. Exacerbated by: Arm movement. He has tried rest and position changes for the symptoms. The treatment provided mild relief. Review of Systems   Musculoskeletal:  Positive for arthralgias. Negative for joint swelling. Neurological:  Negative for weakness and numbness. Objective:  Vitals:    11/27/23 0820   BP: 124/86   Pulse: (!) 114   Weight: 102 kg (225 lb)   Height: 5' 7" (1.702 m)      Ortho Exam    Physical Exam  Vitals and nursing note reviewed. Constitutional:       Appearance: Normal appearance. He is well-developed. He is not ill-appearing or diaphoretic. HENT:      Head: Normocephalic and atraumatic. Right Ear: External ear normal.      Left Ear: External ear normal.   Eyes:      Conjunctiva/sclera: Conjunctivae normal.   Neck:      Trachea: No tracheal deviation. Cardiovascular:      Comments: Tachycardic  Pulmonary:      Effort: Pulmonary effort is normal. No respiratory distress. Abdominal:      General: There is no distension. Skin:     General: Skin is warm and dry. Coloration: Skin is not jaundiced or pale. Neurological:      Mental Status: He is alert and oriented to person, place, and time. Psychiatric:         Mood and Affect: Mood normal.         Behavior: Behavior normal.         Thought Content:  Thought content normal.         Judgment: Judgment normal.           Large joint arthrocentesis: bilateral subacromial bursa  Universal Protocol:  Consent: Verbal consent obtained. Risks and benefits: risks, benefits and alternatives were discussed  Consent given by: patient  Time out: Immediately prior to procedure a "time out" was called to verify the correct patient, procedure, equipment, support staff and site/side marked as required. Patient understanding: patient states understanding of the procedure being performed  Patient consent: the patient's understanding of the procedure matches consent given  Procedure consent: procedure consent matches procedure scheduled  Relevant documents: relevant documents present and verified  Test results: test results available and properly labeled  Site marked: the operative site was marked  Radiology Images displayed and confirmed.  If images not available, report reviewed: imaging studies available  Required items: required blood products, implants, devices, and special equipment available  Patient identity confirmed: verbally with patient  Supporting Documentation  Indications: pain   Procedure Details  Location: shoulder - bilateral subacromial bursa  Preparation: Patient was prepped and draped in the usual sterile fashion  Needle gauge: 21G 2"  Approach: lateral    Medications (Right): 4 mL bupivacaine 0.25 %; 1 mL bupivacaine 0.25 %; 40 mg triamcinolone acetonide 40 mg/mLMedications (Left): 1 mL bupivacaine 0.25 %; 4 mL bupivacaine 0.25 %; 40 mg triamcinolone acetonide 40 mg/mL   Patient tolerance: patient tolerated the procedure well with no immediate complications  Dressing:  Sterile dressing applied

## 2024-02-11 DIAGNOSIS — K92.1 MELENA: ICD-10-CM

## 2024-02-12 RX ORDER — PANTOPRAZOLE SODIUM 40 MG/1
40 TABLET, DELAYED RELEASE ORAL DAILY
Qty: 90 TABLET | Refills: 1 | Status: SHIPPED | OUTPATIENT
Start: 2024-02-12

## 2024-09-10 ENCOUNTER — TELEPHONE (OUTPATIENT)
Dept: FAMILY MEDICINE CLINIC | Facility: CLINIC | Age: 49
End: 2024-09-10

## 2024-09-10 NOTE — TELEPHONE ENCOUNTER
Pts spouse called requesting an appointment for pt for either Friday or Monday for a recurring sinus infection and a possible bacterial infection from getting a pedicure. They were requesting to see Dr. Dawson on either Friday, 9/13/2024 or Monday, 9/16/2024 due to his  schedule.     As of right now, there are no available openings with Dr. Dawson or any of the providers. Is there somewhere you would like me to overbook the patient?

## 2024-09-11 NOTE — TELEPHONE ENCOUNTER
Spoke to patient - offered multiple days/times over the next 2 weeks that Dr. Dawson has available - none of which he can do with his job as a .  He needs late afternoon appts.  Patient stated he will try to work out something and call us back.

## 2024-10-11 ENCOUNTER — OFFICE VISIT (OUTPATIENT)
Dept: FAMILY MEDICINE CLINIC | Facility: CLINIC | Age: 49
End: 2024-10-11
Payer: COMMERCIAL

## 2024-10-11 VITALS
OXYGEN SATURATION: 95 % | HEIGHT: 67 IN | HEART RATE: 106 BPM | SYSTOLIC BLOOD PRESSURE: 132 MMHG | TEMPERATURE: 98.6 F | WEIGHT: 219 LBS | DIASTOLIC BLOOD PRESSURE: 82 MMHG | BODY MASS INDEX: 34.37 KG/M2

## 2024-10-11 DIAGNOSIS — I10 PRIMARY HYPERTENSION: ICD-10-CM

## 2024-10-11 DIAGNOSIS — Z13.6 ENCOUNTER FOR LIPID SCREENING FOR CARDIOVASCULAR DISEASE: ICD-10-CM

## 2024-10-11 DIAGNOSIS — Z13.220 ENCOUNTER FOR LIPID SCREENING FOR CARDIOVASCULAR DISEASE: ICD-10-CM

## 2024-10-11 DIAGNOSIS — Z00.00 ANNUAL PHYSICAL EXAM: Primary | ICD-10-CM

## 2024-10-11 PROCEDURE — 99396 PREV VISIT EST AGE 40-64: CPT | Performed by: FAMILY MEDICINE

## 2024-10-11 NOTE — PROGRESS NOTES
Adult Annual Physical  Name: Frank Cm      : 1975      MRN: [id number]  Encounter Provider: Johanne Li DO  Encounter Date: 10/11/2024   Encounter department: [facility] [address]    Assessment & Plan  Annual physical exam    Orders:    CBC and differential; Future    Lipid panel; Future    Comprehensive metabolic panel; Future    Encounter for lipid screening for cardiovascular disease    Orders:    CBC and differential; Future    Lipid panel; Future    Comprehensive metabolic panel; Future    Primary hypertension    Orders:    CBC and differential; Future    Lipid panel; Future    Comprehensive metabolic panel; Future    BMI 34.0-34.9,adult           Immunizations and preventive care screenings were discussed with patient today. Appropriate education was printed on patient's after visit summary.    Discussed risks and benefits of prostate cancer screening. We discussed the controversial history of PSA screening for prostate cancer in the [place] as well as the risk of over detection and over treatment of prostate cancer by way of PSA screening.  The patient understands that PSA blood testing is an imperfect way to screen for prostate cancer and that elevated PSA levels in the blood may also be caused by infection, inflammation, prostatic trauma or manipulation, urological procedures, or by benign prostatic enlargement.    The role of the digital rectal examination in prostate cancer screening was also discussed and I discussed with him that there is large interobserver variability in the findings of digital rectal examination.    Counseling:  Alcohol/drug use: discussed moderation in alcohol intake, the recommendations for healthy alcohol use, and avoidance of illicit drug use.  Dental Health: discussed importance of regular tooth brushing, flossing, and dental visits.  Sexual health: discussed sexually transmitted diseases, partner selection, use  "of condoms, avoidance of unintended pregnancy, and contraceptive alternatives.  Exercise: the importance of regular exercise/physical activity was discussed. Recommend exercise 3-5 times per week for at least 30 minutes.       Depression Screening and Follow-up Plan: Patient was screened for depression during today's encounter. They screened negative with a PHQ-2 score of 0.    Tobacco Cessation Counseling: Tobacco cessation counseling was provided. The patient is sincerely urged to quit consumption of tobacco. He is not ready to quit tobacco.       History of Present Illness     Adult Annual Physical:  Patient presents for annual physical. Notes he had a fungal infection 2 months ago on his right toes from getting a pedicure. Used OTC cream for toes and it went away. Now he has rash on upper right thigh. He has not used anything for this.  Was on antibiotic 2 weeks ago for sinus and ear infection, his symptoms have since resolved.     Notes that he has had a rash on his thigh for a few days he believes, first noted today. Small, erythematous, circular. Does not itch or bother. .     Diet and Physical Activity:  - Diet/Nutrition: well balanced diet.  - Exercise: no formal exercise.    Depression Screening:  - PHQ-2 Score: 0    General Health:  - Sleep: sleeps well and 7-8 hours of sleep on average.  - Hearing: normal hearing right ear and normal hearing left ear.  - Vision: vision problems. Needs to schedule eye dr  - Dental: regular dental visits and brushes teeth once daily. flosses daily     Health:  - History of STDs: no.   - Urinary symptoms: none.     Review of Systems      Objective     /82   Pulse (!) 106   Temp 98.6 °F (37 °C)   Ht 5' 7\" (1.702 m)   Wt 99.3 kg (219 lb)   SpO2 95%   BMI 34.30 kg/m²     Physical Exam  Vitals reviewed.   Constitutional:       General: He is not in acute distress.     Appearance: Normal appearance.   HENT:      Head: Normocephalic and atraumatic.      Right Ear: " External ear normal.      Left Ear: External ear normal.      Nose: Nose normal.      Mouth/Throat:      Mouth: Mucous membranes are moist.   Eyes:      Extraocular Movements: Extraocular movements intact.      Conjunctiva/sclera: Conjunctivae normal.   Cardiovascular:      Rate and Rhythm: Normal rate and regular rhythm.      Heart sounds: Normal heart sounds.   Pulmonary:      Effort: Pulmonary effort is normal.      Breath sounds: Normal breath sounds. No wheezing, rhonchi or rales.   Abdominal:      General: Bowel sounds are normal. There is no distension.      Palpations: Abdomen is soft.      Tenderness: There is no abdominal tenderness.   Musculoskeletal:      Right lower leg: No edema.      Left lower leg: No edema.   Skin:     General: Skin is warm.      Capillary Refill: Capillary refill takes less than 2 seconds.      Findings: Rash (small, dry circular rash) present.   Neurological:      Mental Status: He is alert. Mental status is at baseline.         Johanne Li DO  [facility]  10/11/2024 3:40 PM

## 2024-10-11 NOTE — PATIENT INSTRUCTIONS
"Patient Education     Routine physical for adults   The Basics   Written by the doctors and editors at [facility]   What is a physical? -- A physical is a routine visit, or \"check-up,\" with your doctor. You might also hear it called a \"wellness visit\" or \"preventive visit.\"  During each visit, the doctor will:   Ask about your physical and mental health   Ask about your habits, behaviors, and lifestyle   Do an exam   Give you vaccines if needed   Talk to you about any medicines you take   Give advice about your health   Answer your questions  Getting regular check-ups is an important part of taking care of your health. It can help your doctor find and treat any problems you have. But it's also important for preventing health problems.  A routine physical is different from a \"sick visit.\" A sick visit is when you see a doctor because of a health concern or problem. Since physicals are scheduled ahead of time, you can think about what you want to ask the doctor.  How often should I get a physical? -- It depends on your age and health. In general, for people age 21 years and older:   If you are younger than 50 years, you might be able to get a physical every 3 years.   If you are 50 years or older, your doctor might recommend a physical every year.  If you have an ongoing health condition, like diabetes or high blood pressure, your doctor will probably want to see you more often.  What happens during a physical? -- In general, each visit will include:   Physical exam - The doctor or nurse will check your height, weight, heart rate, and blood pressure. They will also look at your eyes and ears. They will ask about how you are feeling and whether you have any symptoms that bother you.   Medicines - It's a good idea to bring a list of all the medicines you take to each doctor visit. Your doctor will talk to you about your medicines and answer any questions. Tell them if you are having any side effects that bother you. You " "should also tell them if you are having trouble paying for any of your medicines.   Habits and behaviors - This includes:   Your diet   Your exercise habits   Whether you smoke, drink alcohol, or use drugs   Whether you are sexually active   Whether you feel safe at home  Your doctor will talk to you about things you can do to improve your health and lower your risk of health problems. They will also offer help and support. For example, if you want to quit smoking, they can give you advice and might prescribe medicines. If you want to improve your diet or get more physical activity, they can help you with this, too.   Lab tests, if needed - The tests you get will depend on your age and situation. For example, your doctor might want to check your:   Cholesterol   Blood sugar   Iron level   Vaccines - The recommended vaccines will depend on your age, health, and what vaccines you already had. Vaccines are very important because they can prevent certain serious or deadly infections.   Discussion of screening - \"Screening\" means checking for diseases or other health problems before they cause symptoms. Your doctor can recommend screening based on your age, risk, and preferences. This might include tests to check for:   Cancer, such as breast, prostate, cervical, ovarian, colorectal, prostate, lung, or skin cancer   Sexually transmitted infections, such as chlamydia and gonorrhea   Mental health conditions like depression and anxiety  Your doctor will talk to you about the different types of screening tests. They can help you decide which screenings to have. They can also explain what the results might mean.   Answering questions - The physical is a good time to ask the doctor or nurse questions about your health. If needed, they can refer you to other doctors or specialists, too.  Adults older than 65 years often need other care, too. As you get older, your doctor will talk to you about:   How to prevent falling at " home   Hearing or vision tests   Memory testing   How to take your medicines safely   Making sure that you have the help and support you need at home  All topics are updated as new evidence becomes available and our peer review process is complete.  This topic retrieved from [employer] on: May 02, 2024.  Topic 614411 Version 1.0  Release: 32.4.3 - C32.122  © 2024 UpToDate, Inc. and/or its affiliates. All rights reserved.  Consumer Information Use and Disclaimer   Disclaimer: This generalized information is a limited summary of diagnosis, treatment, and/or medication information. It is not meant to be comprehensive and should be used as a tool to help the user understand and/or assess potential diagnostic and treatment options. It does NOT include all information about conditions, treatments, medications, side effects, or risks that may apply to a specific patient. It is not intended to be medical advice or a substitute for the medical advice, diagnosis, or treatment of a health care provider based on the health care provider's examination and assessment of a patient's specific and unique circumstances. Patients must speak with a health care provider for complete information about their health, medical questions, and treatment options, including any risks or benefits regarding use of medications. This information does not endorse any treatments or medications as safe, effective, or approved for treating a specific patient. UpToDate, Inc. and its affiliates disclaim any warranty or liability relating to this information or the use thereof.The use of this information is governed by the Terms of Use, available at https://www.woltersClinical Insightuwer.com/en/know/clinical-effectiveness-terms. 2024© UpToDate, Inc. and its affiliates and/or licensors. All rights reserved.  Copyright   © 2024 UpToDate, Inc. and/or its affiliates. All rights reserved.    Patient Education     Routine physical for adults   The Basics   Written by the  "doctors and editors at [facility]   What is a physical? -- A physical is a routine visit, or \"check-up,\" with your doctor. You might also hear it called a \"wellness visit\" or \"preventive visit.\"  During each visit, the doctor will:   Ask about your physical and mental health   Ask about your habits, behaviors, and lifestyle   Do an exam   Give you vaccines if needed   Talk to you about any medicines you take   Give advice about your health   Answer your questions  Getting regular check-ups is an important part of taking care of your health. It can help your doctor find and treat any problems you have. But it's also important for preventing health problems.  A routine physical is different from a \"sick visit.\" A sick visit is when you see a doctor because of a health concern or problem. Since physicals are scheduled ahead of time, you can think about what you want to ask the doctor.  How often should I get a physical? -- It depends on your age and health. In general, for people age 21 years and older:   If you are younger than 50 years, you might be able to get a physical every 3 years.   If you are 50 years or older, your doctor might recommend a physical every year.  If you have an ongoing health condition, like diabetes or high blood pressure, your doctor will probably want to see you more often.  What happens during a physical? -- In general, each visit will include:   Physical exam - The doctor or nurse will check your height, weight, heart rate, and blood pressure. They will also look at your eyes and ears. They will ask about how you are feeling and whether you have any symptoms that bother you.   Medicines - It's a good idea to bring a list of all the medicines you take to each doctor visit. Your doctor will talk to you about your medicines and answer any questions. Tell them if you are having any side effects that bother you. You should also tell them if you are having trouble paying for any of your " "medicines.   Habits and behaviors - This includes:   Your diet   Your exercise habits   Whether you smoke, drink alcohol, or use drugs   Whether you are sexually active   Whether you feel safe at home  Your doctor will talk to you about things you can do to improve your health and lower your risk of health problems. They will also offer help and support. For example, if you want to quit smoking, they can give you advice and might prescribe medicines. If you want to improve your diet or get more physical activity, they can help you with this, too.   Lab tests, if needed - The tests you get will depend on your age and situation. For example, your doctor might want to check your:   Cholesterol   Blood sugar   Iron level   Vaccines - The recommended vaccines will depend on your age, health, and what vaccines you already had. Vaccines are very important because they can prevent certain serious or deadly infections.   Discussion of screening - \"Screening\" means checking for diseases or other health problems before they cause symptoms. Your doctor can recommend screening based on your age, risk, and preferences. This might include tests to check for:   Cancer, such as breast, prostate, cervical, ovarian, colorectal, prostate, lung, or skin cancer   Sexually transmitted infections, such as chlamydia and gonorrhea   Mental health conditions like depression and anxiety  Your doctor will talk to you about the different types of screening tests. They can help you decide which screenings to have. They can also explain what the results might mean.   Answering questions - The physical is a good time to ask the doctor or nurse questions about your health. If needed, they can refer you to other doctors or specialists, too.  Adults older than 65 years often need other care, too. As you get older, your doctor will talk to you about:   How to prevent falling at home   Hearing or vision tests   Memory testing   How to take your " medicines safely   Making sure that you have the help and support you need at home  All topics are updated as new evidence becomes available and our peer review process is complete.  This topic retrieved from [employer] on: May 02, 2024.  Topic 982422 Version 1.0  Release: 32.4.3 - C32.122  © 2024 UpToDate, Inc. and/or its affiliates. All rights reserved.  Consumer Information Use and Disclaimer   Disclaimer: This generalized information is a limited summary of diagnosis, treatment, and/or medication information. It is not meant to be comprehensive and should be used as a tool to help the user understand and/or assess potential diagnostic and treatment options. It does NOT include all information about conditions, treatments, medications, side effects, or risks that may apply to a specific patient. It is not intended to be medical advice or a substitute for the medical advice, diagnosis, or treatment of a health care provider based on the health care provider's examination and assessment of a patient's specific and unique circumstances. Patients must speak with a health care provider for complete information about their health, medical questions, and treatment options, including any risks or benefits regarding use of medications. This information does not endorse any treatments or medications as safe, effective, or approved for treating a specific patient. UpToDate, Inc. and its affiliates disclaim any warranty or liability relating to this information or the use thereof.The use of this information is governed by the Terms of Use, available at https://www.woltersFroontuwer.com/en/know/clinical-effectiveness-terms. 2024© UpToDate, Inc. and its affiliates and/or licensors. All rights reserved.  Copyright   © 2024 UpToDate, Inc. and/or its affiliates. All rights reserved.

## 2024-10-11 NOTE — ASSESSMENT & PLAN NOTE
Orders:    CBC and differential; Future    Lipid panel; Future    Comprehensive metabolic panel; Future

## 2024-10-14 ENCOUNTER — TELEPHONE (OUTPATIENT)
Dept: FAMILY MEDICINE CLINIC | Facility: CLINIC | Age: 49
End: 2024-10-14

## 2024-10-14 DIAGNOSIS — R21 RASH: Primary | ICD-10-CM

## 2024-10-14 RX ORDER — TRIAMCINOLONE ACETONIDE 5 MG/G
CREAM TOPICAL 3 TIMES DAILY
Qty: 15 G | Refills: 0 | Status: SHIPPED | OUTPATIENT
Start: 2024-10-14

## 2024-10-14 NOTE — TELEPHONE ENCOUNTER
An antifungal was not to be sent, he said he wanted to use the one he had at home that he bought over the counter as this is what worked for his foot. He stated taht he would use this cream and follow up if no improvement. What is the cream he is using OTC?    Johanne Li DO  [facility]  10/14/2024 11:32 AM

## 2024-10-14 NOTE — TELEPHONE ENCOUNTER
Spoke with patient's wife- she stated that it was OTC hydrocortisone cream. It is not working.    Please advise, thank you!

## 2024-10-14 NOTE — TELEPHONE ENCOUNTER
Patient's wife stopped by the office. She stated that her  was evaluated on Friday for what they thought was psoriasis. After consulting with Dr. Li, a fungal cream was to be sent in for patient. After reviewing his med list, it does not appear that it was called in.    May we please send a script to his local [facility] pharmacy?  Please advise, thank you!

## 2024-10-14 NOTE — TELEPHONE ENCOUNTER
This is not an antifungal, there seems to be mis-information that was provided by the patient at the time of his visit. This is a steroid cream. A script for a stronger steroid sent.     DO Zane Reese [facility]  10/14/2024 11:52 AM

## 2025-01-23 ENCOUNTER — OFFICE VISIT (OUTPATIENT)
Dept: FAMILY MEDICINE CLINIC | Facility: CLINIC | Age: 50
End: 2025-01-23
Payer: COMMERCIAL

## 2025-01-23 VITALS
HEIGHT: 67 IN | WEIGHT: 224 LBS | SYSTOLIC BLOOD PRESSURE: 120 MMHG | TEMPERATURE: 97.8 F | BODY MASS INDEX: 35.16 KG/M2 | RESPIRATION RATE: 16 BRPM | DIASTOLIC BLOOD PRESSURE: 84 MMHG | OXYGEN SATURATION: 95 % | HEART RATE: 82 BPM

## 2025-01-23 DIAGNOSIS — R68.89 FLU-LIKE SYMPTOMS: Primary | ICD-10-CM

## 2025-01-23 DIAGNOSIS — J32.9 RECURRENT SINUSITIS: ICD-10-CM

## 2025-01-23 DIAGNOSIS — J10.1 INFLUENZA A: ICD-10-CM

## 2025-01-23 LAB
SARS-COV-2 AG UPPER RESP QL IA: NEGATIVE
SL AMB POCT RAPID FLU A: POSITIVE
SL AMB POCT RAPID FLU B: NEGATIVE
VALID CONTROL: NORMAL

## 2025-01-23 PROCEDURE — 99214 OFFICE O/P EST MOD 30 MIN: CPT | Performed by: STUDENT IN AN ORGANIZED HEALTH CARE EDUCATION/TRAINING PROGRAM

## 2025-01-23 PROCEDURE — 87811 SARS-COV-2 COVID19 W/OPTIC: CPT | Performed by: STUDENT IN AN ORGANIZED HEALTH CARE EDUCATION/TRAINING PROGRAM

## 2025-01-23 PROCEDURE — 87804 INFLUENZA ASSAY W/OPTIC: CPT | Performed by: STUDENT IN AN ORGANIZED HEALTH CARE EDUCATION/TRAINING PROGRAM

## 2025-01-23 RX ORDER — OSELTAMIVIR PHOSPHATE 75 MG/1
75 CAPSULE ORAL EVERY 12 HOURS SCHEDULED
Qty: 10 CAPSULE | Refills: 0 | Status: SHIPPED | OUTPATIENT
Start: 2025-01-23 | End: 2025-01-28

## 2025-01-23 NOTE — PROGRESS NOTES
"Name: Frank Cm      : 1975      MRN: [id number]  Encounter Provider: Itz Dawson MD  Encounter Date: 2025   Encounter department: [facility] [address]  :  Assessment & Plan  Flu-like symptoms    Orders:  •  POCT rapid flu A and B  •  POCT Rapid Covid Ag  •  oseltamivir (TAMIFLU) 75 mg capsule; Take 1 capsule (75 mg total) by mouth every 12 (twelve) hours for 5 days    Influenza A    Orders:  •  oseltamivir (TAMIFLU) 75 mg capsule; Take 1 capsule (75 mg total) by mouth every 12 (twelve) hours for 5 days    Recurrent sinusitis    Orders:  •  Ambulatory Referral to Otolaryngology; Future           History of Present Illness   Cough  This is a new problem. The current episode started in the past 7 days. The problem has been gradually worsening. The cough is Non-productive. Associated symptoms include chills, a fever, headaches, nasal congestion and postnasal drip.   Generalized Body Aches  Associated symptoms include headaches, fatigue, a fever and coughing.       Few weeks ago had a sinus infection, had abx. Never really wenmt away but 2 days ago it got much worse. Lots of pressure,, coughing, muscle aches and fever started last night.    He has alkso had recurrent sinus symptoms for the last vrew years even when not ill. Never had any allergies as well. Would like to see a specialist for this  Review of Systems   Constitutional:  Positive for activity change, appetite change, chills, fatigue and fever.   HENT:  Positive for postnasal drip and sinus pressure.    Respiratory:  Positive for cough.    Neurological:  Positive for headaches.       Objective   /84 (BP Location: Left arm, Cuff Size: Large)   Pulse 82   Temp 97.8 °F (36.6 °C)   Resp 16   Ht 5' 7\" (1.702 m)   Wt 102 kg (224 lb)   SpO2 95%   BMI 35.08 kg/m²      Physical Exam  Constitutional:       General: He is not in acute distress.     Appearance: Normal appearance. He is not " ill-appearing, toxic-appearing or diaphoretic.   HENT:      Head: Normocephalic and atraumatic.      Right Ear: Tympanic membrane, ear canal and external ear normal. There is no impacted cerumen.      Left Ear: Tympanic membrane, ear canal and external ear normal. There is no impacted cerumen.      Nose: Congestion and rhinorrhea present.   Pulmonary:      Breath sounds: Transmitted upper airway sounds present. No wheezing, rhonchi or rales.   Neurological:      Mental Status: He is alert.

## 2025-03-14 ENCOUNTER — HOSPITAL ENCOUNTER (OUTPATIENT)
Dept: CT IMAGING | Facility: HOSPITAL | Age: 50
End: 2025-03-14
Payer: COMMERCIAL

## 2025-03-14 DIAGNOSIS — J32.9 SINUSITIS, UNSPECIFIED CHRONICITY, UNSPECIFIED LOCATION: ICD-10-CM

## 2025-03-14 DIAGNOSIS — R51.9 FACIAL PAIN: ICD-10-CM

## 2025-03-14 DIAGNOSIS — F17.200 TOBACCO DEPENDENCE: ICD-10-CM

## 2025-03-14 PROCEDURE — 70486 CT MAXILLOFACIAL W/O DYE: CPT

## 2025-04-18 ENCOUNTER — APPOINTMENT (OUTPATIENT)
Dept: LAB | Facility: HOSPITAL | Age: 50
End: 2025-04-18
Attending: FAMILY MEDICINE
Payer: COMMERCIAL

## 2025-04-18 DIAGNOSIS — Z00.00 ANNUAL PHYSICAL EXAM: ICD-10-CM

## 2025-04-18 DIAGNOSIS — Z13.220 ENCOUNTER FOR LIPID SCREENING FOR CARDIOVASCULAR DISEASE: ICD-10-CM

## 2025-04-18 DIAGNOSIS — I10 PRIMARY HYPERTENSION: ICD-10-CM

## 2025-04-18 DIAGNOSIS — Z13.6 ENCOUNTER FOR LIPID SCREENING FOR CARDIOVASCULAR DISEASE: ICD-10-CM

## 2025-04-18 LAB
ALBUMIN SERPL BCG-MCNC: 4.4 G/DL (ref 3.5–5)
ALP SERPL-CCNC: 60 U/L (ref 34–104)
ALT SERPL W P-5'-P-CCNC: 55 U/L (ref 7–52)
ANION GAP SERPL CALCULATED.3IONS-SCNC: 6 MMOL/L (ref 4–13)
AST SERPL W P-5'-P-CCNC: 53 U/L (ref 13–39)
BASOPHILS # BLD AUTO: 0.06 THOUSANDS/ÂΜL (ref 0–0.1)
BASOPHILS NFR BLD AUTO: 1 % (ref 0–1)
BILIRUB SERPL-MCNC: 0.44 MG/DL (ref 0.2–1)
BUN SERPL-MCNC: 15 MG/DL (ref 5–25)
CALCIUM SERPL-MCNC: 9.1 MG/DL (ref 8.4–10.2)
CHLORIDE SERPL-SCNC: 107 MMOL/L (ref 96–108)
CHOLEST SERPL-MCNC: 148 MG/DL (ref ?–200)
CO2 SERPL-SCNC: 25 MMOL/L (ref 21–32)
CREAT SERPL-MCNC: 0.74 MG/DL (ref 0.6–1.3)
EOSINOPHIL # BLD AUTO: 0.18 THOUSAND/ÂΜL (ref 0–0.61)
EOSINOPHIL NFR BLD AUTO: 1 % (ref 0–6)
ERYTHROCYTE [DISTWIDTH] IN BLOOD BY AUTOMATED COUNT: 12.8 % (ref 11.6–15.1)
GFR SERPL CREATININE-BSD FRML MDRD: 106 ML/MIN/1.73SQ M
GLUCOSE P FAST SERPL-MCNC: 93 MG/DL (ref 65–99)
HCT VFR BLD AUTO: 52 % (ref 36.5–49.3)
HDLC SERPL-MCNC: 45 MG/DL
HGB BLD-MCNC: 18.1 G/DL (ref 12–17)
IMM GRANULOCYTES # BLD AUTO: 0.05 THOUSAND/UL (ref 0–0.2)
IMM GRANULOCYTES NFR BLD AUTO: 0 % (ref 0–2)
LDLC SERPL CALC-MCNC: 83 MG/DL (ref 0–100)
LYMPHOCYTES # BLD AUTO: 3.03 THOUSANDS/ÂΜL (ref 0.6–4.47)
LYMPHOCYTES NFR BLD AUTO: 24 % (ref 14–44)
MCH RBC QN AUTO: 32 PG (ref 26.8–34.3)
MCHC RBC AUTO-ENTMCNC: 34.8 G/DL (ref 31.4–37.4)
MCV RBC AUTO: 92 FL (ref 82–98)
MONOCYTES # BLD AUTO: 0.93 THOUSAND/ÂΜL (ref 0.17–1.22)
MONOCYTES NFR BLD AUTO: 7 % (ref 4–12)
NEUTROPHILS # BLD AUTO: 8.38 THOUSANDS/ÂΜL (ref 1.85–7.62)
NEUTS SEG NFR BLD AUTO: 67 % (ref 43–75)
NONHDLC SERPL-MCNC: 103 MG/DL
NRBC BLD AUTO-RTO: 0 /100 WBCS
PLATELET # BLD AUTO: 252 THOUSANDS/UL (ref 149–390)
PMV BLD AUTO: 9.1 FL (ref 8.9–12.7)
POTASSIUM SERPL-SCNC: 3.8 MMOL/L (ref 3.5–5.3)
PROT SERPL-MCNC: 6.6 G/DL (ref 6.4–8.4)
RBC # BLD AUTO: 5.66 MILLION/UL (ref 3.88–5.62)
SODIUM SERPL-SCNC: 138 MMOL/L (ref 135–147)
TRIGL SERPL-MCNC: 101 MG/DL (ref ?–150)
WBC # BLD AUTO: 12.63 THOUSAND/UL (ref 4.31–10.16)

## 2025-04-18 PROCEDURE — 85025 COMPLETE CBC W/AUTO DIFF WBC: CPT

## 2025-04-18 PROCEDURE — 80061 LIPID PANEL: CPT

## 2025-04-18 PROCEDURE — 36415 COLL VENOUS BLD VENIPUNCTURE: CPT

## 2025-04-18 PROCEDURE — 80053 COMPREHEN METABOLIC PANEL: CPT

## 2025-04-21 ENCOUNTER — RESULTS FOLLOW-UP (OUTPATIENT)
Dept: FAMILY MEDICINE CLINIC | Facility: CLINIC | Age: 50
End: 2025-04-21

## 2025-04-21 DIAGNOSIS — D75.1 POLYCYTHEMIA: Primary | ICD-10-CM

## 2025-04-21 DIAGNOSIS — R74.8 ELEVATED LIVER ENZYMES: ICD-10-CM

## 2025-04-21 NOTE — TELEPHONE ENCOUNTER
"Patient called back regarding lab results and PCP recommendations.     Patient prefers to wait on liver ultrasound. He would like to repeat blood work to check liver enzymes in 1-2 months instead.    Patient reports he had been drinking heavily for a couple months, \"binge drinking.\" He feels this has affected his lab results. He has been alcohol-free for 19 days.  "

## 2025-04-21 NOTE — TELEPHONE ENCOUNTER
Patient called regarding recent lab results    Advised of Dr. Dawson' message.     Patient aware of both referrals. Will schedule the US and will contact Hematology/Oncology

## 2025-04-23 NOTE — TELEPHONE ENCOUNTER
----- Message from Izabela PÉREZ sent at 4/21/2025  3:52 PM EDT -----    ----- Message -----  From: Carmen Goel RN  Sent: 4/21/2025   3:25 PM EDT  To: #

## 2025-06-10 ENCOUNTER — VBI (OUTPATIENT)
Dept: ADMINISTRATIVE | Facility: OTHER | Age: 50
End: 2025-06-10

## 2025-06-10 NOTE — TELEPHONE ENCOUNTER
06/10/25 2:13 PM     Chart reviewed for CRC: Colonoscopy was/were submitted to the patient's insurance.     Jonh Arredondo MA   [facility]

## 2025-07-08 ENCOUNTER — APPOINTMENT (EMERGENCY)
Dept: CT IMAGING | Facility: HOSPITAL | Age: 50
End: 2025-07-08
Payer: COMMERCIAL

## 2025-07-08 ENCOUNTER — HOSPITAL ENCOUNTER (EMERGENCY)
Facility: HOSPITAL | Age: 50
End: 2025-07-08
Attending: EMERGENCY MEDICINE | Admitting: EMERGENCY MEDICINE
Payer: COMMERCIAL

## 2025-07-08 ENCOUNTER — HOSPITAL ENCOUNTER (INPATIENT)
Facility: HOSPITAL | Age: 50
LOS: 3 days | Discharge: HOME | End: 2025-07-11
Payer: COMMERCIAL

## 2025-07-08 VITALS
HEART RATE: 92 BPM | RESPIRATION RATE: 21 BRPM | OXYGEN SATURATION: 92 % | SYSTOLIC BLOOD PRESSURE: 146 MMHG | TEMPERATURE: 98.2 F | DIASTOLIC BLOOD PRESSURE: 79 MMHG

## 2025-07-08 DIAGNOSIS — I10 PRIMARY HYPERTENSION: ICD-10-CM

## 2025-07-08 DIAGNOSIS — F10.939 ALCOHOL WITHDRAWAL (HCC): Primary | ICD-10-CM

## 2025-07-08 DIAGNOSIS — F10.10 ALCOHOL ABUSE: Primary | ICD-10-CM

## 2025-07-08 DIAGNOSIS — F10.20 ALCOHOL USE DISORDER, SEVERE, DEPENDENCE (HCC): ICD-10-CM

## 2025-07-08 PROBLEM — R74.01 TRANSAMINITIS: Status: ACTIVE | Noted: 2025-07-08

## 2025-07-08 LAB
2HR DELTA HS TROPONIN: -1 NG/L
ALBUMIN SERPL BCG-MCNC: 4.2 G/DL (ref 3.5–5)
ALP SERPL-CCNC: 85 U/L (ref 34–104)
ALT SERPL W P-5'-P-CCNC: 69 U/L (ref 7–52)
ANION GAP SERPL CALCULATED.3IONS-SCNC: 11 MMOL/L (ref 4–13)
APAP SERPL-MCNC: <2 UG/ML (ref 10–20)
APTT PPP: 25 SECONDS (ref 23–34)
AST SERPL W P-5'-P-CCNC: 46 U/L (ref 13–39)
ATRIAL RATE: 119 BPM
B-OH-BUTYR SERPL-MCNC: <0.05 MMOL/L (ref 0.2–0.6)
BASE EX.OXY STD BLDV CALC-SCNC: 86.2 % (ref 60–80)
BASE EXCESS BLDV CALC-SCNC: 3.5 MMOL/L
BASOPHILS # BLD AUTO: 0.06 THOUSANDS/ÂΜL (ref 0–0.1)
BASOPHILS NFR BLD AUTO: 1 % (ref 0–1)
BILIRUB SERPL-MCNC: 0.76 MG/DL (ref 0.2–1)
BILIRUB UR QL STRIP: NEGATIVE
BUN SERPL-MCNC: 5 MG/DL (ref 5–25)
CALCIUM SERPL-MCNC: 8.6 MG/DL (ref 8.4–10.2)
CARDIAC TROPONIN I PNL SERPL HS: 7 NG/L (ref ?–50)
CARDIAC TROPONIN I PNL SERPL HS: 8 NG/L (ref ?–50)
CHLORIDE SERPL-SCNC: 103 MMOL/L (ref 96–108)
CK SERPL-CCNC: 139 U/L (ref 39–308)
CLARITY UR: CLEAR
CO2 SERPL-SCNC: 26 MMOL/L (ref 21–32)
COLOR UR: COLORLESS
CREAT SERPL-MCNC: 0.68 MG/DL (ref 0.6–1.3)
EOSINOPHIL # BLD AUTO: 0.18 THOUSAND/ÂΜL (ref 0–0.61)
EOSINOPHIL NFR BLD AUTO: 2 % (ref 0–6)
ERYTHROCYTE [DISTWIDTH] IN BLOOD BY AUTOMATED COUNT: 12.4 % (ref 11.6–15.1)
ETHANOL SERPL-MCNC: 351 MG/DL
GFR SERPL CREATININE-BSD FRML MDRD: 110 ML/MIN/1.73SQ M
GLUCOSE SERPL-MCNC: 98 MG/DL (ref 65–140)
GLUCOSE UR STRIP-MCNC: NEGATIVE MG/DL
HCO3 BLDV-SCNC: 27.1 MMOL/L (ref 24–30)
HCT VFR BLD AUTO: 53.2 % (ref 36.5–49.3)
HGB BLD-MCNC: 19.2 G/DL (ref 12–17)
HGB UR QL STRIP.AUTO: NEGATIVE
IMM GRANULOCYTES # BLD AUTO: 0.03 THOUSAND/UL (ref 0–0.2)
IMM GRANULOCYTES NFR BLD AUTO: 0 % (ref 0–2)
INR PPP: 0.88 (ref 0.85–1.19)
KETONES UR STRIP-MCNC: NEGATIVE MG/DL
LACTATE SERPL-SCNC: 2 MMOL/L (ref 0.5–2)
LEUKOCYTE ESTERASE UR QL STRIP: NEGATIVE
LIPASE SERPL-CCNC: 70 U/L (ref 11–82)
LYMPHOCYTES # BLD AUTO: 4.67 THOUSANDS/ÂΜL (ref 0.6–4.47)
LYMPHOCYTES NFR BLD AUTO: 45 % (ref 14–44)
MAGNESIUM SERPL-MCNC: 2.3 MG/DL (ref 1.9–2.7)
MCH RBC QN AUTO: 31.5 PG (ref 26.8–34.3)
MCHC RBC AUTO-ENTMCNC: 36.1 G/DL (ref 31.4–37.4)
MCV RBC AUTO: 87 FL (ref 82–98)
MONOCYTES # BLD AUTO: 0.85 THOUSAND/ÂΜL (ref 0.17–1.22)
MONOCYTES NFR BLD AUTO: 8 % (ref 4–12)
NEUTROPHILS # BLD AUTO: 4.71 THOUSANDS/ÂΜL (ref 1.85–7.62)
NEUTS SEG NFR BLD AUTO: 44 % (ref 43–75)
NITRITE UR QL STRIP: NEGATIVE
NRBC BLD AUTO-RTO: 0 /100 WBCS
O2 CT BLDV-SCNC: 24 ML/DL
P AXIS: 45 DEGREES
PCO2 BLDV: 38.1 MM HG (ref 42–50)
PH BLDV: 7.47 [PH] (ref 7.3–7.4)
PH UR STRIP.AUTO: 7 [PH]
PHOSPHATE SERPL-MCNC: 3.1 MG/DL (ref 2.7–4.5)
PLATELET # BLD AUTO: 149 THOUSANDS/UL (ref 149–390)
PMV BLD AUTO: 8.8 FL (ref 8.9–12.7)
PO2 BLDV: 84.3 MM HG (ref 35–45)
POTASSIUM SERPL-SCNC: 3.8 MMOL/L (ref 3.5–5.3)
PR INTERVAL: 146 MS
PROT SERPL-MCNC: 6.7 G/DL (ref 6.4–8.4)
PROT UR STRIP-MCNC: NEGATIVE MG/DL
PROTHROMBIN TIME: 12.6 SECONDS (ref 12.3–15)
QRS AXIS: -72 DEGREES
QRSD INTERVAL: 86 MS
QT INTERVAL: 318 MS
QTC INTERVAL: 447 MS
RBC # BLD AUTO: 6.09 MILLION/UL (ref 3.88–5.62)
SALICYLATES SERPL-MCNC: <5 MG/DL (ref 5–20)
SODIUM SERPL-SCNC: 140 MMOL/L (ref 135–147)
SP GR UR STRIP.AUTO: 1 (ref 1–1.03)
T WAVE AXIS: 54 DEGREES
UROBILINOGEN UR STRIP-ACNC: <2 MG/DL
VENTRICULAR RATE: 119 BPM
WBC # BLD AUTO: 10.5 THOUSAND/UL (ref 4.31–10.16)

## 2025-07-08 PROCEDURE — 80179 DRUG ASSAY SALICYLATE: CPT | Performed by: EMERGENCY MEDICINE

## 2025-07-08 PROCEDURE — 80143 DRUG ASSAY ACETAMINOPHEN: CPT | Performed by: EMERGENCY MEDICINE

## 2025-07-08 PROCEDURE — 93010 ELECTROCARDIOGRAM REPORT: CPT | Performed by: INTERNAL MEDICINE

## 2025-07-08 PROCEDURE — 83605 ASSAY OF LACTIC ACID: CPT | Performed by: EMERGENCY MEDICINE

## 2025-07-08 PROCEDURE — 82010 KETONE BODYS QUAN: CPT | Performed by: EMERGENCY MEDICINE

## 2025-07-08 PROCEDURE — 85610 PROTHROMBIN TIME: CPT | Performed by: EMERGENCY MEDICINE

## 2025-07-08 PROCEDURE — 99284 EMERGENCY DEPT VISIT MOD MDM: CPT | Performed by: EMERGENCY MEDICINE

## 2025-07-08 PROCEDURE — 82550 ASSAY OF CK (CPK): CPT | Performed by: EMERGENCY MEDICINE

## 2025-07-08 PROCEDURE — 82077 ASSAY SPEC XCP UR&BREATH IA: CPT | Performed by: EMERGENCY MEDICINE

## 2025-07-08 PROCEDURE — 84100 ASSAY OF PHOSPHORUS: CPT | Performed by: EMERGENCY MEDICINE

## 2025-07-08 PROCEDURE — 83690 ASSAY OF LIPASE: CPT | Performed by: EMERGENCY MEDICINE

## 2025-07-08 PROCEDURE — 96366 THER/PROPH/DIAG IV INF ADDON: CPT

## 2025-07-08 PROCEDURE — 81003 URINALYSIS AUTO W/O SCOPE: CPT | Performed by: EMERGENCY MEDICINE

## 2025-07-08 PROCEDURE — 36415 COLL VENOUS BLD VENIPUNCTURE: CPT | Performed by: EMERGENCY MEDICINE

## 2025-07-08 PROCEDURE — 84484 ASSAY OF TROPONIN QUANT: CPT | Performed by: EMERGENCY MEDICINE

## 2025-07-08 PROCEDURE — 93005 ELECTROCARDIOGRAM TRACING: CPT

## 2025-07-08 PROCEDURE — 74176 CT ABD & PELVIS W/O CONTRAST: CPT

## 2025-07-08 PROCEDURE — 82805 BLOOD GASES W/O2 SATURATION: CPT | Performed by: EMERGENCY MEDICINE

## 2025-07-08 PROCEDURE — 83735 ASSAY OF MAGNESIUM: CPT | Performed by: EMERGENCY MEDICINE

## 2025-07-08 PROCEDURE — 85730 THROMBOPLASTIN TIME PARTIAL: CPT | Performed by: EMERGENCY MEDICINE

## 2025-07-08 PROCEDURE — 85025 COMPLETE CBC W/AUTO DIFF WBC: CPT | Performed by: EMERGENCY MEDICINE

## 2025-07-08 PROCEDURE — 96365 THER/PROPH/DIAG IV INF INIT: CPT

## 2025-07-08 PROCEDURE — 99285 EMERGENCY DEPT VISIT HI MDM: CPT

## 2025-07-08 PROCEDURE — 99223 1ST HOSP IP/OBS HIGH 75: CPT

## 2025-07-08 PROCEDURE — 80053 COMPREHEN METABOLIC PANEL: CPT | Performed by: EMERGENCY MEDICINE

## 2025-07-08 PROCEDURE — 99223 1ST HOSP IP/OBS HIGH 75: CPT | Performed by: EMERGENCY MEDICINE

## 2025-07-08 PROCEDURE — 96361 HYDRATE IV INFUSION ADD-ON: CPT

## 2025-07-08 RX ORDER — DIAZEPAM 5 MG/1
5 TABLET ORAL ONCE
Status: COMPLETED | OUTPATIENT
Start: 2025-07-08 | End: 2025-07-08

## 2025-07-08 RX ORDER — PHENOBARBITAL 64.8 MG/1
64.8 TABLET ORAL ONCE
Status: COMPLETED | OUTPATIENT
Start: 2025-07-08 | End: 2025-07-08

## 2025-07-08 RX ORDER — PHENOBARBITAL SODIUM 130 MG/ML
130 INJECTION, SOLUTION INTRAMUSCULAR; INTRAVENOUS ONCE
Status: COMPLETED | OUTPATIENT
Start: 2025-07-08 | End: 2025-07-08

## 2025-07-08 RX ORDER — ENOXAPARIN SODIUM 100 MG/ML
40 INJECTION SUBCUTANEOUS DAILY
Status: DISCONTINUED | OUTPATIENT
Start: 2025-07-08 | End: 2025-07-11 | Stop reason: HOSPADM

## 2025-07-08 RX ORDER — LANOLIN ALCOHOL/MO/W.PET/CERES
100 CREAM (GRAM) TOPICAL DAILY
Status: DISCONTINUED | OUTPATIENT
Start: 2025-07-08 | End: 2025-07-09

## 2025-07-08 RX ORDER — PHENOBARBITAL SODIUM 130 MG/ML
260 INJECTION, SOLUTION INTRAMUSCULAR; INTRAVENOUS ONCE
Status: COMPLETED | OUTPATIENT
Start: 2025-07-08 | End: 2025-07-08

## 2025-07-08 RX ORDER — LORAZEPAM 1 MG/1
2 TABLET ORAL ONCE
Status: COMPLETED | OUTPATIENT
Start: 2025-07-08 | End: 2025-07-08

## 2025-07-08 RX ORDER — TRAZODONE HYDROCHLORIDE 50 MG/1
50 TABLET ORAL
Status: DISCONTINUED | OUTPATIENT
Start: 2025-07-08 | End: 2025-07-11 | Stop reason: HOSPADM

## 2025-07-08 RX ORDER — AMLODIPINE BESYLATE 10 MG/1
10 TABLET ORAL DAILY
Status: DISCONTINUED | OUTPATIENT
Start: 2025-07-08 | End: 2025-07-11 | Stop reason: HOSPADM

## 2025-07-08 RX ORDER — ATENOLOL 25 MG/1
25 TABLET ORAL DAILY
Status: DISCONTINUED | OUTPATIENT
Start: 2025-07-08 | End: 2025-07-11 | Stop reason: HOSPADM

## 2025-07-08 RX ORDER — FOLIC ACID 1 MG/1
1 TABLET ORAL DAILY
Status: DISCONTINUED | OUTPATIENT
Start: 2025-07-08 | End: 2025-07-11 | Stop reason: HOSPADM

## 2025-07-08 RX ORDER — SODIUM CHLORIDE, SODIUM GLUCONATE, SODIUM ACETATE, POTASSIUM CHLORIDE, MAGNESIUM CHLORIDE, SODIUM PHOSPHATE, DIBASIC, AND POTASSIUM PHOSPHATE .53; .5; .37; .037; .03; .012; .00082 G/100ML; G/100ML; G/100ML; G/100ML; G/100ML; G/100ML; G/100ML
100 INJECTION, SOLUTION INTRAVENOUS CONTINUOUS
Status: DISCONTINUED | OUTPATIENT
Start: 2025-07-08 | End: 2025-07-09

## 2025-07-08 RX ORDER — SODIUM CHLORIDE, SODIUM GLUCONATE, SODIUM ACETATE, POTASSIUM CHLORIDE, MAGNESIUM CHLORIDE, SODIUM PHOSPHATE, DIBASIC, AND POTASSIUM PHOSPHATE .53; .5; .37; .037; .03; .012; .00082 G/100ML; G/100ML; G/100ML; G/100ML; G/100ML; G/100ML; G/100ML
1000 INJECTION, SOLUTION INTRAVENOUS ONCE
Status: COMPLETED | OUTPATIENT
Start: 2025-07-08 | End: 2025-07-08

## 2025-07-08 RX ADMIN — DIAZEPAM 5 MG: 5 TABLET ORAL at 05:45

## 2025-07-08 RX ADMIN — NICOTINE POLACRILEX 2 MG: 2 GUM, CHEWING BUCCAL at 06:06

## 2025-07-08 RX ADMIN — ENOXAPARIN SODIUM 40 MG: 40 INJECTION SUBCUTANEOUS at 13:21

## 2025-07-08 RX ADMIN — FOLIC ACID 1 MG: 1 TABLET ORAL at 13:21

## 2025-07-08 RX ADMIN — NICOTINE POLACRILEX 2 MG: 2 GUM, CHEWING BUCCAL at 20:28

## 2025-07-08 RX ADMIN — THIAMINE HCL TAB 100 MG 100 MG: 100 TAB at 13:21

## 2025-07-08 RX ADMIN — LORAZEPAM 2 MG: 1 TABLET ORAL at 08:11

## 2025-07-08 RX ADMIN — PHENOBARBITAL SODIUM 260 MG: 130 INJECTION INTRAMUSCULAR; INTRAVENOUS at 13:49

## 2025-07-08 RX ADMIN — ATENOLOL 25 MG: 25 TABLET ORAL at 13:48

## 2025-07-08 RX ADMIN — SODIUM CHLORIDE, SODIUM GLUCONATE, SODIUM ACETATE, POTASSIUM CHLORIDE, MAGNESIUM CHLORIDE, SODIUM PHOSPHATE, DIBASIC, AND POTASSIUM PHOSPHATE 100 ML/HR: .53; .5; .37; .037; .03; .012; .00082 INJECTION, SOLUTION INTRAVENOUS at 21:55

## 2025-07-08 RX ADMIN — PHENOBARBITAL 64.8 MG: 64.8 TABLET ORAL at 16:14

## 2025-07-08 RX ADMIN — PHENOBARBITAL 64.8 MG: 64.8 TABLET ORAL at 20:26

## 2025-07-08 RX ADMIN — SODIUM CHLORIDE, SODIUM GLUCONATE, SODIUM ACETATE, POTASSIUM CHLORIDE, MAGNESIUM CHLORIDE, SODIUM PHOSPHATE, DIBASIC, AND POTASSIUM PHOSPHATE 100 ML/HR: .53; .5; .37; .037; .03; .012; .00082 INJECTION, SOLUTION INTRAVENOUS at 13:22

## 2025-07-08 RX ADMIN — SODIUM CHLORIDE 1000 ML: 0.9 INJECTION, SOLUTION INTRAVENOUS at 03:33

## 2025-07-08 RX ADMIN — PHENOBARBITAL SODIUM 130 MG: 130 INJECTION INTRAMUSCULAR; INTRAVENOUS at 21:55

## 2025-07-08 RX ADMIN — PHENOBARBITAL 64.8 MG: 64.8 TABLET ORAL at 13:49

## 2025-07-08 RX ADMIN — SODIUM CHLORIDE 1000 ML: 0.9 INJECTION, SOLUTION INTRAVENOUS at 05:00

## 2025-07-08 RX ADMIN — SODIUM CHLORIDE, SODIUM GLUCONATE, SODIUM ACETATE, POTASSIUM CHLORIDE, MAGNESIUM CHLORIDE, SODIUM PHOSPHATE, DIBASIC, AND POTASSIUM PHOSPHATE 1000 ML: .53; .5; .37; .037; .03; .012; .00082 INJECTION, SOLUTION INTRAVENOUS at 05:01

## 2025-07-08 RX ADMIN — NICOTINE POLACRILEX 2 MG: 2 GUM, CHEWING BUCCAL at 03:54

## 2025-07-08 RX ADMIN — DIAZEPAM 5 MG: 5 TABLET ORAL at 03:33

## 2025-07-08 RX ADMIN — MULTIPLE VITAMINS W/ MINERALS TAB 1 TABLET: TAB ORAL at 13:21

## 2025-07-08 RX ADMIN — TRAZODONE HYDROCHLORIDE 50 MG: 50 TABLET ORAL at 21:55

## 2025-07-08 RX ADMIN — AMLODIPINE BESYLATE 10 MG: 10 TABLET ORAL at 13:21

## 2025-07-08 NOTE — ASSESSMENT & PLAN NOTE
Patient states he has not been compliant with his hypertensive medication  - Initiated prior to admission amlodipine and atenolol.

## 2025-07-08 NOTE — CONSULTS
Consultation - Medical Toxicology   Name: Frank Cm 50 y.o. male I MRN: [id number]  Unit/Bed#: 5T DETOX 514-01 I Date of Admission: 7/8/2025   Date of Service: 7/8/2025 I Hospital Day: 0   Inpatient consult to Toxicology  Consult performed by: Saeid Soares DO  Consult ordered by: Bessie Seaman MD        Physician Requesting Evaluation: Bessie Seaman MD   Reason for Evaluation / Principal Problem: Alcohol withdrawal    Assessment & Plan  Tobacco dependence  NRT while in the hospital  4 minutes of cessation counseling provided  Alcohol withdrawal syndrome with complication (HCC)  The patient's withdrawal is complicated by a h/o sleep apnea warranting medically managed withdrawal.   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 He seems to be manifesting some of this  Current evidence of withdrawal includes tongue fasciculations and tachycardia  We will start SEWS protocol for phenobarbital management and monitoring  Continue supportive care, thiamine, folate  Alcohol use disorder, severe, dependence (HCC)  CM/CRS involved for aftercare planning and linkage to ongoing AUD treatment after discharge  Will discuss naltrexone for JULIO with the patient prior to discharge.   I have discussed the above management plan in detail with the primary service. And the primary service agrees with the plan of [facility] for phenobarbital management      For further questions, please contact the medical  on call via SecureChat between 8am and 9pm. If between 9pm and 8am, please reach out to the [facility]ison [facility] at [phone].     History of Present Illness   Frank Cm is a 50 y.o. year old male who presents with Alcohol withdrawal. He has a h/o AUD, mainly in a binge pattern. He went 3 months without alcohol use then had recurrence of use 10 days ago. During that 10 days, he drank large amounts of hard alcohol and large amounts of beers, up to 24  per day. He cannot quantify the exact amount of hard alcohol he was drinking. He tried stopping yesterday, but developed chest pressure and severe palpitations along with increased anxiety. He has had similar symptoms previously when stopping, but never this bad. He denies other substance use    Review of Systems   Constitutional:  Negative for chills and fever.   HENT:  Negative for ear pain and sore throat.    Eyes:  Negative for pain and visual disturbance.   Respiratory:  Positive for chest tightness. Negative for cough and shortness of breath.    Cardiovascular:  Positive for palpitations. Negative for chest pain.   Gastrointestinal:  Negative for abdominal pain and vomiting.   Genitourinary:  Negative for dysuria and hematuria.   Musculoskeletal:  Negative for arthralgias and back pain.   Skin:  Negative for color change and rash.   Neurological:  Positive for tremors. Negative for seizures and syncope.   Psychiatric/Behavioral:  The patient is nervous/anxious.    All other systems reviewed and are negative.      Historical Information   Medical History Review: I have reviewed the patient's PMH, PSH, Social History, Family History, Meds, and Allergies   Social History[1]  Family History[2]    Meds/Allergies   Prior to Admission medications    Medication Sig Start Date End Date Taking? Authorizing Provider   fluticasone (FLONASE) 50 mcg/act nasal spray 1-2 sprays into each nostril daily 4/28/25  Yes Maksim Schaffer MD   amLODIPine (NORVASC) 10 mg tablet  8/18/22   Historical Provider, MD   atenolol (TENORMIN) 25 mg tablet Take 25 mg by mouth daily  Patient not taking: Reported on 7/8/2025 8/28/23   Historical Provider, MD   cholecalciferol (VITAMIN D3) 1,000 units tablet Take 1 tablet (1,000 Units total) by mouth daily With food.  Patient not taking: Reported on 7/8/2025 6/26/23   Itz Dawson MD   triamcinolone (KENALOG) 0.5 % cream Apply topically 3 (three) times a day  Patient not taking: Reported on  7/8/2025 10/14/24   Johanne Guillermo, DO   Current Medications[3]   Allergies[4]    Objective :  Temp:  [98.2 °F (36.8 °C)-98.8 °F (37.1 °C)] 98.8 °F (37.1 °C)  HR:  [] 116  BP: (124-178)/() 125/97  Resp:  [12-21] 18  SpO2:  [90 %-96 %] 94 %  O2 Device: None (Room air)    No intake or output data in the 24 hours ending 07/08/25 1324    Physical Exam  Constitutional:       General: He is not in acute distress.     Appearance: Normal appearance.   HENT:      Mouth/Throat:      Mouth: Mucous membranes are moist.     Eyes:      Extraocular Movements: Extraocular movements intact.       Cardiovascular:      Rate and Rhythm: Regular rhythm. Tachycardia present.   Pulmonary:      Effort: Pulmonary effort is normal.      Breath sounds: Normal breath sounds.   Abdominal:      General: There is no distension.      Palpations: Abdomen is soft.     Musculoskeletal:      Right lower leg: No edema.      Left lower leg: No edema.     Skin:     Coloration: Skin is not jaundiced.     Neurological:      Mental Status: He is alert and oriented to person, place, and time.      Gait: Gait normal.      Comments: Mild intention tremor with tongue fasciculations   Psychiatric:         Behavior: Behavior normal.           Lab Results: I have reviewed the following results:  Results from last 7 days   Lab Units 07/08/25  0333   WBC Thousand/uL 10.50*   HEMOGLOBIN g/dL 19.2*   HEMATOCRIT % 53.2*   PLATELETS Thousands/uL 149   SEGS PCT % 44   LYMPHO PCT % 45*   MONO PCT % 8   EOS PCT % 2      Results from last 7 days   Lab Units 07/08/25  0333   POTASSIUM mmol/L 3.8   CHLORIDE mmol/L 103   CO2 mmol/L 26   BUN mg/dL 5   CREATININE mg/dL 0.68   CALCIUM mg/dL 8.6   ALBUMIN g/dL 4.2   ALK PHOS U/L 85   ALT U/L 69*   AST U/L 46*   MAGNESIUM mg/dL 2.3   PHOSPHORUS mg/dL 3.1      Results from last 7 days   Lab Units 07/08/25  0333   INR  0.88   PTT seconds 25     Results from last 7 days   Lab Units 07/08/25  0502   LACTIC ACID mmol/L 2.0      Results from last 7 days   Lab Units 07/08/25  0545 07/08/25  0333   HS TNI 0HR ng/L  --  8   HS TNI 2HR ng/L 7  --       Results from last 7 days   Lab Units 07/08/25  0333   PH JAK  7.470*   PCO2 JAK mm Hg 38.1*   PO2 JAK mm Hg 84.3*   HCO3 JAK mmol/L 27.1   O2 CONTENT JAK ml/dL 24.0   O2 HGB, VENOUS % 86.2*     Results from last 7 days   Lab Units 07/08/25  0333   ACETAMINOPHEN LVL ug/mL <2*   ETHANOL LVL mg/dL 351*   SALICYLATE LVL mg/dL <5*     Imaging Results Review: No pertinent imaging studies reviewed.  Other Study Results Review: EKG was personally reviewed and my interpretation is: Sinus Tachycardia. , QRS 86 ms, Qtc 447 ms..    Administrative Statements   I have spent a total time of 45 minutes in caring for this patient on the day of the visit/encounter including Impressions, Documenting in the medical record, Obtaining or reviewing history  , and Communicating with other healthcare professionals .       [1]   Social History  Tobacco Use    Smoking status: Every Day     Current packs/day: 2.00     Average packs/day: 2.0 packs/day for 5.0 years (10.0 ttl pk-yrs)     Types: Cigarettes    Smokeless tobacco: Never    Tobacco comments:     Quit for 12 years, used medication (can't remember name) and states he didn't feel good with them.   Vaping Use    Vaping status: Never Used   Substance and Sexual Activity    Alcohol use: Yes     Alcohol/week: 2.0 standard drinks of alcohol     Types: 2 Shots of liquor per week     Comment: daily    Drug use: Never    Sexual activity: Yes     Partners: Female   [2] No family history on file.  [3]   Current Facility-Administered Medications:     amLODIPine (NORVASC) tablet 10 mg, 10 mg, Oral, Daily, Bessie Seaman MD, 10 mg at 07/08/25 1321    atenolol (TENORMIN) tablet 25 mg, 25 mg, Oral, Daily, Bessie Seaman MD    enoxaparin (LOVENOX) subcutaneous injection 40 mg, 40 mg, Subcutaneous, Daily, Bessie Seaman MD, 40 mg at 07/08/25 1321    folic acid (FOLVITE)  tablet 1 mg, 1 mg, Oral, Daily, Bessie Seaman MD, 1 mg at 07/08/25 1321    multi-electrolyte (Plasmalyte-A/Isolyte-S PH 7.4/Normosol-R) IV solution, 100 mL/hr, Intravenous, Continuous, Bessie Seaman MD, Last Rate: 100 mL/hr at 07/08/25 1322, 100 mL/hr at 07/08/25 1322    multivitamin-minerals (CENTRUM) tablet 1 tablet, 1 tablet, Oral, Daily, Bessie Seaman MD, 1 tablet at 07/08/25 1321    nicotine polacrilex (NICORETTE) gum 2 mg, 2 mg, Oral, Q2H PRN, Bessie Seaman MD    PHENobarbital tablet 64.8 mg, 64.8 mg, Oral, Once, Saeid Soares DO    thiamine tablet 100 mg, 100 mg, Oral, Daily, Bessie Seaman MD, 100 mg at 07/08/25 1321    traZODone (DESYREL) tablet 50 mg, 50 mg, Oral, HS PRN, Bessie Seaman MD  [4] No Known Allergies

## 2025-07-08 NOTE — ASSESSMENT & PLAN NOTE
Patient is a 50-year-old male with past medical history significant for nonlimited to tobacco use disorder along with alcohol use and hypertension presented to the ED of [facility] with complaints of anxiety tremors chest tightness palpitations and tremors.  Patient last alcoholic drink was prior to coming to the ED at [facility].      -Continue with telemetry and continuous pulse oximetry monitoring  - Continue with vitals monitoring  - Toxicology on board, agree with SEWs protocol   - Continue with IV fluids along with thiamine folic acid and multivitamin  - Consult to crisis  - [facility]  - Toxicology on board appreciate recs  - Continue monitor symptoms

## 2025-07-08 NOTE — ASSESSMENT & PLAN NOTE
The patient's withdrawal is complicated by a h/o sleep apnea warranting medically managed withdrawal.   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 He seems to be manifesting some of this  Current evidence of withdrawal includes tongue fasciculations and tachycardia  We will start SEWS protocol for phenobarbital management and monitoring  Continue supportive care, thiamine, folate

## 2025-07-08 NOTE — H&P
H&P - Hospitalist   Name: Frank Cm 50 y.o. male I MRN: [id number]  Unit/Bed#: 5T DETOX 514-01 I Date of Admission: 7/8/2025   Date of Service: 7/8/2025 I Hospital Day: 0     Assessment & Plan  Alcohol withdrawal syndrome with complication (HCC)  Patient is a 50-year-old male with past medical history significant for nonlimited to tobacco use disorder along with alcohol use and hypertension presented to the ED of [facility] with complaints of anxiety tremors chest tightness palpitations and tremors.  Patient last alcoholic drink was prior to coming to the ED at [facility].      -Continue with telemetry and continuous pulse oximetry monitoring  - Continue with vitals monitoring  - Toxicology on board, [facility]   - Continue with IV fluids along with thiamine folic acid and multivitamin  - Consult to crisis  - Consult case management/crisis for assistant with aftercare resources  - Toxicology on board appreciate recs  - Continue monitor symptoms  Tobacco dependence  Smoking cessation and counseling done  - Prefers nicotine gum ordered  Hypertension  Patient states he has not been compliant with his hypertensive medication  - Initiated prior to admission amlodipine and atenolol.  Alcohol use disorder, severe, dependence (HCC)  As above under alcohol withdrawal    - Had been drinking large amounts of hard alcohol and large amounts of beer up to 24/days since last 10 days.    Transaminitis  Functions range  - Likely secondary to alcohol use  - Morning labs ordered  - Will continue to monitor  - Denies any abdominal pain nausea vomiting      VTE Pharmacologic Prophylaxis:   Enoxaparin  Code Status: Level 1 - Full Code   Discussion with family: Patient declined call to .     Anticipated Length of Stay: Patient will be admitted on an inpatient basis with an anticipated length of stay of greater than 2 midnights secondary to alcohol withdrawal.    History of Present Illness    Chief Complaint: Alcohol withdrawal    Frank Cm is a 50 y.o. male with a past medical history significant for nonlimited to tobacco use disorder along with alcohol use and hypertension presented to the ED of [facility] with complaints of anxiety tremors chest tightness palpitations and tremors.  Patient last alcoholic drink was prior to coming to the ED at [facility].  Review of Systems   Constitutional:  Negative for chills and fever.   HENT:  Negative for ear pain and sore throat.    Eyes:  Negative for pain and visual disturbance.   Respiratory:  Negative for cough and shortness of breath.    Cardiovascular:  Negative for chest pain and palpitations.   Gastrointestinal:  Negative for abdominal pain and vomiting.   Genitourinary:  Negative for dysuria and hematuria.   Musculoskeletal:  Negative for arthralgias and back pain.   Skin:  Negative for color change and rash.   Neurological:  Positive for tremors. Negative for seizures and syncope.   Psychiatric/Behavioral:          Anxious   All other systems reviewed and are negative.      Historical Information   Past Medical History[1]  Past Surgical History[2]  Social History[3]  E-Cigarette/Vaping    E-Cigarette Use Never User      E-Cigarette/Vaping Substances    Nicotine No     THC No     CBD No     Flavoring No     Other No     Unknown No        Social History:  Marital Status: /Civil Union       Meds/Allergies     Prior to Admission medications    Medication Sig Start Date End Date Taking? Authorizing Provider   fluticasone (FLONASE) 50 mcg/act nasal spray 1-2 sprays into each nostril daily 4/28/25  Yes Maksim Schaffer MD   amLODIPine (NORVASC) 10 mg tablet  8/18/22   Historical Provider, MD   atenolol (TENORMIN) 25 mg tablet Take 25 mg by mouth daily  Patient not taking: Reported on 7/8/2025 8/28/23   Historical Provider, MD   cholecalciferol (VITAMIN D3) 1,000 units tablet Take 1 tablet (1,000 Units total) by mouth daily With  food.  Patient not taking: Reported on 7/8/2025 6/26/23   Itz Dawson MD   triamcinolone (KENALOG) 0.5 % cream Apply topically 3 (three) times a day  Patient not taking: Reported on 7/8/2025 10/14/24   Johanne Li, DO     No Known Allergies    Objective :  Temp:  [98.2 °F (36.8 °C)-98.8 °F (37.1 °C)] 98.4 °F (36.9 °C)  HR:  [] 92  BP: (124-178)/() 132/85  Resp:  [12-21] 16  SpO2:  [90 %-96 %] 90 %  O2 Device: None (Room air)    Physical Exam  Vitals and nursing note reviewed.   Constitutional:       General: He is not in acute distress.     Appearance: He is well-developed. He is obese.   HENT:      Head: Normocephalic and atraumatic.     Eyes:      Conjunctiva/sclera: Conjunctivae normal.       Cardiovascular:      Rate and Rhythm: Normal rate and regular rhythm.      Heart sounds: No murmur heard.  Pulmonary:      Effort: Pulmonary effort is normal. No respiratory distress.      Breath sounds: Normal breath sounds.   Abdominal:      Palpations: Abdomen is soft.      Tenderness: There is no abdominal tenderness.     Musculoskeletal:         General: No swelling.      Cervical back: Neck supple.     Skin:     General: Skin is warm and dry.      Capillary Refill: Capillary refill takes less than 2 seconds.     Neurological:      Mental Status: He is alert and oriented to person, place, and time.     Psychiatric:         Mood and Affect: Mood normal.          Lines/Drains:            Lab Results: I have reviewed the following results:  Results from last 7 days   Lab Units 07/08/25  0333   WBC Thousand/uL 10.50*   HEMOGLOBIN g/dL 19.2*   HEMATOCRIT % 53.2*   PLATELETS Thousands/uL 149   SEGS PCT % 44   LYMPHO PCT % 45*   MONO PCT % 8   EOS PCT % 2     Results from last 7 days   Lab Units 07/08/25  0333   SODIUM mmol/L 140   POTASSIUM mmol/L 3.8   CHLORIDE mmol/L 103   CO2 mmol/L 26   BUN mg/dL 5   CREATININE mg/dL 0.68   ANION GAP mmol/L 11   CALCIUM mg/dL 8.6   ALBUMIN g/dL 4.2   TOTAL BILIRUBIN  mg/dL 0.76   ALK PHOS U/L 85   ALT U/L 69*   AST U/L 46*   GLUCOSE RANDOM mg/dL 98     Results from last 7 days   Lab Units 07/08/25  0333   INR  0.88         Lab Results   Component Value Date    HGBA1C 5.5 06/19/2023    HGBA1C 5.5 02/22/2020    HGBA1C 5.3 01/06/2018     Results from last 7 days   Lab Units 07/08/25  0502   LACTIC ACID mmol/L 2.0       CT abdomen pelvis wo contrast  Result Date: 7/8/2025  Impression: 1.  No acute findings in the abdomen or pelvis. No evidence of nephrolithiasis. 2.  Hepatic steatosis Workstation performed: [id number]         Administrative Statements   I have spent a total time of >75 minutes in caring for this patient on the day of the visit/encounter including Prognosis, Patient and family education, Risk factor reductions, Impressions, Documenting in the medical record, Reviewing/placing orders in the medical record (including tests, medications, and/or procedures), and Communicating with other healthcare professionals .    ** Please Note: This note has been constructed using a voice recognition system. **         [1]   Past Medical History:  Diagnosis Date    Ear problems     Hypertension     Nosebleed    [2]   Past Surgical History:  Procedure Laterality Date    [place] NASAL/SINUS NDSC SURG W/CONTROL NASAL HEMORRHAGE N/A 10/21/2022    Procedure: NASAL ENDOSCOPY WITH CONTROL OF EPISTAXIS;  Surgeon: Maksim Schaffer MD;  Location: [facility];  Service: ENT   [3]   Social History  Tobacco Use    Smoking status: Every Day     Current packs/day: 2.00     Average packs/day: 2.0 packs/day for 5.0 years (10.0 ttl pk-yrs)     Types: Cigarettes    Smokeless tobacco: Never    Tobacco comments:     Quit for 12 years, used medication (can't remember name) and states he didn't feel good with them.   Vaping Use    Vaping status: Never Used   Substance and Sexual Activity    Alcohol use: Yes     Alcohol/week: 2.0 standard drinks of alcohol     Types: 2 Shots of liquor per week     Comment: daily     Drug use: Never    Sexual activity: Yes     Partners: Female

## 2025-07-08 NOTE — ASSESSMENT & PLAN NOTE
CM/CRS involved for aftercare planning and linkage to ongoing AUD treatment after discharge  Will discuss naltrexone for JULIO with the patient prior to discharge.

## 2025-07-08 NOTE — ED PROVIDER NOTES
"Time reflects when diagnosis was documented in both MDM as applicable and the Disposition within this note       Time User Action Codes Description Comment    7/8/2025  9:30 AM Chanell Castillo Add [F10.939] Alcohol withdrawal (HCC)           ED Disposition       ED Disposition   Transfer to Another Facility-In Network    Condition   --    Date/Time   Tue Jul 8, 2025  9:30 AM    Comment   Frank Cm should be transferred out to .               Assessment & Plan       Medical Decision Making  50-year-old male presents to the emergency room with multiple symptoms after binge drinking for the past 10 days.  Patient has a long history of alcohol use but had not drank alcohol for a few months prior to this.    Patient states he was drinking \"anything I had in the house\" but since then has been drinking mainly scotch.  Patient states he had his last drink shortly prior to coming to the emergency room though he was attempting to limit his alcohol use over the past day to avoid withdrawal symptoms.    Patient states since that time he has been having palpitations, diaphoresis, tremors, and having difficulty with sleep.    Patient notes he developed bilateral flank pain over the past few days and states he has had darker than typical urine though he denies any dysuria or urinary frequency.    Patient affirms nausea but denies vomiting.  Patient denies any anterior abdominal pain.  Patient denies any diarrhea.    Patient denies any thoughts of self-harm or harm to others.    Impression and plan: Multiple symptoms with a broad differential.  Clinically could represent early withdrawal however patient states he drank shortly prior to coming to the emergency room.  Patient has an extensive history of alcohol use so we will obtain metabolic and toxicologic evaluation.  Patient denies any thoughts self-harm and no history to support involuntary admission to the hospital.  I have strongly suggested detox to the patient.  Will " obtain evaluation for potential alcoholic ketoacidosis and treat patient symptomatically.  Will monitor patient, reassess, and reevaluate.    Amount and/or Complexity of Data Reviewed  Labs: ordered.  Radiology: ordered.    Risk  OTC drugs.  Prescription drug management.             Medications   sodium chloride 0.9 % bolus 1,000 mL (0 mL Intravenous Stopped 7/8/25 1105)   diazepam (VALIUM) tablet 5 mg (5 mg Oral Given 7/8/25 0333)   sodium chloride 0.9 % bolus 1,000 mL (0 mL Intravenous Stopped 7/8/25 0507)   nicotine polacrilex (NICORETTE) gum 2 mg (2 mg Oral Given 7/8/25 0354)   multi-electrolyte (Plasmalyte-A/Isolyte-S PH 7.4/Normosol-R) IV bolus 1,000 mL (0 mL Intravenous Stopped 7/8/25 1105)   diazepam (VALIUM) tablet 5 mg (5 mg Oral Given 7/8/25 0545)   nicotine polacrilex (NICORETTE) gum 2 mg (2 mg Oral Given 7/8/25 0606)   LORazepam (ATIVAN) tablet 2 mg (2 mg Oral Given 7/8/25 0811)       ED Risk Strat Scores                    No data recorded                            History of Present Illness       Chief Complaint   Patient presents with    Withdrawal - Alcohol     Pt states being an alcoholic for years currently has been drinking for the past 10 days after being sober for days, has started with sweating, tremors, hallucinations, and insomnia, has been through rehab before, but does not want to this time        Past Medical History[1]   Past Surgical History[2]   Family History[3]   Social History[4]   E-Cigarette/Vaping    E-Cigarette Use Never User       E-Cigarette/Vaping Substances    Nicotine No     THC No     CBD No     Flavoring No     Other No     Unknown No       I have reviewed and agree with the history as documented.     HPI    Review of Systems        Objective       ED Triage Vitals   Temperature Pulse Blood Pressure Respirations SpO2 Patient Position - Orthostatic VS   07/08/25 0228 07/08/25 0229 07/08/25 0230 07/08/25 0229 07/08/25 0229 07/08/25 0229   98.2 °F (36.8 °C) (!) 131 (!)  178/114 18 96 % Sitting      Temp Source Heart Rate Source BP Location FiO2 (%) Pain Score    07/08/25 0228 07/08/25 0229 07/08/25 0229 -- 07/08/25 0315    Oral Monitor Left arm  4      Vitals      Date and Time Temp Pulse SpO2 Resp BP Pain Score FACES Pain Rating User   07/08/25 1100 -- 92 92 % 21 146/79 -- -- LM   07/08/25 1030 -- 92 92 % 17 124/73 -- -- LM   07/08/25 1000 -- 92 90 % 12 139/83 -- -- LM   07/08/25 0900 -- 96 92 % 12 131/78 -- --    07/08/25 0750 -- 109 -- -- 128/89 -- -- LM   07/08/25 0630 -- 95 90 % 13 126/85 -- -- LM   07/08/25 0600 -- 105 93 % 16 150/86 -- -- [facility]   07/08/25 0430 -- 105 91 % 14 129/86 -- -- K   07/08/25 0341 -- 115 93 % 18 160/99 -- -- [facility]   07/08/25 0315 -- 119 92 % 19 175/105 4 -- KM   07/08/25 0230 -- -- -- -- 178/114 -- -- TE   07/08/25 0229 -- 131 96 % 18 -- -- -- TE   07/08/25 0228 98.2 °F (36.8 °C) -- -- -- -- -- -- TE            Physical Exam  Constitutional:       Appearance: Normal appearance.   HENT:      Mouth/Throat:      Pharynx: Oropharynx is clear.     Eyes:      Conjunctiva/sclera: Conjunctivae normal.       Cardiovascular:      Rate and Rhythm: Regular rhythm. Tachycardia present.   Pulmonary:      Effort: Pulmonary effort is normal.   Abdominal:      Tenderness: There is no abdominal tenderness. There is right CVA tenderness and left CVA tenderness. There is no guarding or rebound.     Skin:     General: Skin is warm and dry.     Neurological:      General: No focal deficit present.      Mental Status: He is alert and oriented to person, place, and time.      Gait: Gait normal.      Comments: Normal gait.   Psychiatric:         Thought Content: Thought content does not include homicidal or suicidal ideation. Thought content does not include homicidal or suicidal plan.         Results Reviewed       Procedure Component Value Units Date/Time    HS Troponin I 2hr [[id number]]  (Normal) Collected: 07/08/25 0545    Lab Status: Final result Specimen: Blood from  Hand, Left Updated: 07/08/25 0613     hs TnI 2hr 7 ng/L      Delta 2hr hsTnI -1 ng/L     Lactic acid, plasma (w/reflex if result > 2.0) [[id number]]  (Normal) Collected: 07/08/25 0502    Lab Status: Final result Specimen: Blood from Arm, Left Updated: 07/08/25 0534     LACTIC ACID 2.0 mmol/L     Narrative:      Result may be elevated if tourniquet was used during collection.    UA w Reflex to Microscopic w Reflex to Culture [[id number]]  (Abnormal) Collected: 07/08/25 0501    Lab Status: Final result Specimen: Urine, Clean Catch Updated: 07/08/25 0517     Color, UA Colorless     Clarity, UA Clear     Specific Gravity, UA 1.002     pH, UA 7.0     Leukocytes, UA Negative     Nitrite, UA Negative     Protein, UA Negative mg/dl      Glucose, UA Negative mg/dl      Ketones, UA Negative mg/dl      Urobilinogen, UA <2.0 mg/dl      Bilirubin, UA Negative     Occult Blood, UA Negative    CK [[id number]]  (Normal) Collected: 07/08/25 0333    Lab Status: Final result Specimen: Blood from Arm, Left Updated: 07/08/25 0431     Total  U/L     Protime-INR [[id number]]  (Normal) Collected: 07/08/25 0333    Lab Status: Final result Specimen: Blood from Arm, Left Updated: 07/08/25 0420     Protime 12.6 seconds      INR 0.88    Narrative:      INR Therapeutic Range    Indication                                             INR Range      Atrial Fibrillation                                               2.0-3.0  Hypercoagulable State                                    2.0.2.3  Left Ventricular Asist Device                            2.0-3.0  Mechanical Heart Valve                                  -    Aortic(with afib, MI, embolism, HF, LA enlargement,    and/or coagulopathy)                                     2.0-3.0 (2.5-3.5)     Mitral                                                             2.5-3.5  Prosthetic/Bioprosthetic Heart Valve               2.0-3.0  Venous thromboembolism (VTE: VT, PE        2.0-3.0    APTT [[id number]]   (Normal) Collected: 07/08/25 0333    Lab Status: Final result Specimen: Blood from Arm, Left Updated: 07/08/25 0420     PTT 25 seconds     Comprehensive metabolic panel [[id number]]  (Abnormal) Collected: 07/08/25 0333    Lab Status: Final result Specimen: Blood from Arm, Left Updated: 07/08/25 0420     Sodium 140 mmol/L      Potassium 3.8 mmol/L      Chloride 103 mmol/L      CO2 26 mmol/L      ANION GAP 11 mmol/L      BUN 5 mg/dL      Creatinine 0.68 mg/dL      Glucose 98 mg/dL      Calcium 8.6 mg/dL      AST 46 U/L      ALT 69 U/L      Alkaline Phosphatase 85 U/L      Total Protein 6.7 g/dL      Albumin 4.2 g/dL      Total Bilirubin 0.76 mg/dL      eGFR 110 ml/min/1.73sq m     Narrative:      National Kidney Disease Foundation guidelines for Chronic Kidney Disease (CKD):     Stage 1 with normal or high GFR (GFR > 90 mL/min/1.73 square meters)    Stage 2 Mild CKD (GFR = 60-89 mL/min/1.73 square meters)    Stage 3A Moderate CKD (GFR = 45-59 mL/min/1.73 square meters)    Stage 3B Moderate CKD (GFR = 30-44 mL/min/1.73 square meters)    Stage 4 Severe CKD (GFR = 15-29 mL/min/1.73 square meters)    Stage 5 End Stage CKD (GFR <15 mL/min/1.73 square meters)  Note: GFR calculation is accurate only with a steady state creatinine    Magnesium [[id number]]  (Normal) Collected: 07/08/25 0333    Lab Status: Final result Specimen: Blood from Arm, Left Updated: 07/08/25 0420     Magnesium 2.3 mg/dL     Phosphorus [[id number]]  (Normal) Collected: 07/08/25 0333    Lab Status: Final result Specimen: Blood from Arm, Left Updated: 07/08/25 0420     Phosphorus 3.1 mg/dL     Lipase [[id number]]  (Normal) Collected: 07/08/25 0333    Lab Status: Final result Specimen: Blood from Arm, Left Updated: 07/08/25 0420     Lipase 70 u/L     Salicylate level [[id number]]  (Abnormal) Collected: 07/08/25 0333    Lab Status: Final result Specimen: Blood from Arm, Left Updated: 07/08/25 0420     Salicylate Lvl <5 mg/dL     Acetaminophen level-If  concentration is detectable, please discuss with medical  on call. [[id number]]  (Abnormal) Collected: 07/08/25 0333    Lab Status: Final result Specimen: Blood from Arm, Left Updated: 07/08/25 0420     Acetaminophen Level <2 ug/mL     HS Troponin 0hr (reflex protocol) [[id number]]  (Normal) Collected: 07/08/25 0333    Lab Status: Final result Specimen: Blood from Arm, Left Updated: 07/08/25 0408     hs TnI 0hr 8 ng/L     Ethanol [[id number]]  (Abnormal) Collected: 07/08/25 0333    Lab Status: Final result Specimen: Blood from Arm, Left Updated: 07/08/25 0402     Ethanol Lvl 351 mg/dL     Beta Hydroxybutyrate [[id number]]  (Abnormal) Collected: 07/08/25 0333    Lab Status: Final result Specimen: Blood from Arm, Left Updated: 07/08/25 0400     Beta- Hydroxybutyrate <0.05 mmol/L     CBC and differential [[id number]]  (Abnormal) Collected: 07/08/25 0333    Lab Status: Final result Specimen: Blood from Arm, Left Updated: 07/08/25 0348     WBC 10.50 Thousand/uL      RBC 6.09 Million/uL      Hemoglobin 19.2 g/dL      Hematocrit 53.2 %      MCV 87 fL      MCH 31.5 pg      MCHC 36.1 g/dL      RDW 12.4 %      MPV 8.8 fL      Platelets 149 Thousands/uL      nRBC 0 /100 WBCs      Segmented % 44 %      Immature Grans % 0 %      Lymphocytes % 45 %      Monocytes % 8 %      Eosinophils Relative 2 %      Basophils Relative 1 %      Absolute Neutrophils 4.71 Thousands/µL      Absolute Immature Grans 0.03 Thousand/uL      Absolute Lymphocytes 4.67 Thousands/µL      Absolute Monocytes 0.85 Thousand/µL      Eosinophils Absolute 0.18 Thousand/µL      Basophils Absolute 0.06 Thousands/µL     Blood gas, venous [[id number]]  (Abnormal) Collected: 07/08/25 0333    Lab Status: Final result Specimen: Blood from Arm, Left Updated: 07/08/25 0346     pH, Fernie 7.470     pCO2, Fernie 38.1 mm Hg      pO2, Fernie 84.3 mm Hg      HCO3, Fernie 27.1 mmol/L      Base Excess, Fernie 3.5 mmol/L      O2 Content, Fernie 24.0 ml/dL      O2 HGB, VENOUS 86.2 %              CT abdomen pelvis wo contrast   Final Interpretation by Glen Jade MD (07/08 0657)      1.  No acute findings in the abdomen or pelvis. No evidence of nephrolithiasis.   2.  Hepatic steatosis      Workstation performed: [id number]             Procedures    ED Medication and Procedure Management   None     Discharge Medication List as of 7/8/2025 11:45 AM        CONTINUE these medications which have NOT CHANGED    Details   amLODIPine (NORVASC) 10 mg tablet Starting u 8/18/2022, Historical Med      atenolol (TENORMIN) 25 mg tablet Take 25 mg by mouth daily, Starting Mon 8/28/2023, Historical Med      cholecalciferol (VITAMIN D3) 1,000 units tablet Take 1 tablet (1,000 Units total) by mouth daily With food., Starting Mon 6/26/2023, Normal      fluticasone (FLONASE) 50 mcg/act nasal spray 1-2 sprays into each nostril daily, Starting Mon 4/28/2025, Normal      triamcinolone (KENALOG) 0.5 % cream Apply topically 3 (three) times a day, Starting Mon 10/14/2024, Normal           No discharge procedures on file.  ED SEPSIS DOCUMENTATION   Time reflects when diagnosis was documented in both MDM as applicable and the Disposition within this note       Time User Action Codes Description Comment    7/8/2025  9:30 AM Chanell Castillo Add [F10.939] Alcohol withdrawal (HCC)                      [1]   Past Medical History:  Diagnosis Date    Ear problems     Hypertension     Nosebleed    [2]   Past Surgical History:  Procedure Laterality Date    AK NASAL/SINUS NDSC SURG W/CONTROL NASAL HEMORRHAGE N/A 10/21/2022    Procedure: NASAL ENDOSCOPY WITH CONTROL OF EPISTAXIS;  Surgeon: Maksim Schaffer MD;  Location: [facility];  Service: ENT   [3] No family history on file.  [4]   Social History  Tobacco Use    Smoking status: Every Day     Current packs/day: 2.00     Average packs/day: 2.0 packs/day for 5.0 years (10.0 ttl pk-yrs)     Types: Cigarettes    Smokeless tobacco: Never    Tobacco comments:     Quit for 12 years, used  medication (can't remember name) and states he didn't feel good with them.   Vaping Use    Vaping status: Never Used   Substance Use Topics    Alcohol use: Yes     Alcohol/week: 40.0 - 70.0 standard drinks of alcohol     Types: 40 - 70 Standard drinks or equivalent per week     Comment: daily    Drug use: Never        Krishna Urbano MD  07/12/25 2046

## 2025-07-08 NOTE — EMTALA/ACUTE CARE TRANSFER
[facility] EMERGENCY DEPARTMENT  [address]  [place] [address]  Dept: [phone]      EMTALA TRANSFER CONSENT    NAME Frank CARDOZO 1975                              MRN [id number]    I have been informed of my rights regarding examination, treatment, and transfer   by Dr. Chanell Castillo MD    Benefits: Specialized equipment and/or services available at the receiving facility (Include comment)________________________    Risks: Potential for delay in receiving treatment      Consent for Transfer:  I acknowledge that my medical condition has been evaluated and explained to me by the emergency department physician or other qualified medical person and/or my attending physician, who has recommended that I be transferred to the service of  Accepting Physician: Urszula at Accepting Facility Name, City & State : . The above potential benefits of such transfer, the potential risks associated with such transfer, and the probable risks of not being transferred have been explained to me, and I fully understand them.  The doctor has explained that, in my case, the benefits of transfer outweigh the risks.  I agree to be transferred.    I authorize the performance of emergency medical procedures and treatments upon me in both transit and upon arrival at the receiving facility.  Additionally, I authorize the release of any and all medical records to the receiving facility and request they be transported with me, if possible.  I understand that the safest mode of transportation during a medical emergency is an ambulance and that the Hospital advocates the use of this mode of transport. Risks of traveling to the receiving facility by car, including absence of medical control, life sustaining equipment, such as oxygen, and medical personnel has been explained to me and I fully understand them.    (TERRI CORRECT BOX BELOW)  [  ]  I consent to the  stated transfer and to be transported by ambulance/helicopter.  [  ]  I consent to the stated transfer, but refuse transportation by ambulance and accept full responsibility for my transportation by car.  I understand the risks of non-ambulance transfers and I exonerate the Hospital and its staff from any deterioration in my condition that results from this refusal.    X___________________________________________    DATE  25  TIME________  Signature of patient or legally responsible individual signing on patient behalf           RELATIONSHIP TO PATIENT_________________________          Provider Certification    NAME Frank Cm                                         1975                              MRN [id number]    A medical screening exam was performed on the above named patient.  Based on the examination:    Condition Necessitating Transfer The encounter diagnosis was Alcohol withdrawal (HCC).    Patient Condition: The patient has been stabilized such that within reasonable medical probability, no material deterioration of the patient condition or the condition of the unborn child(cate) is likely to result from the transfer    Reason for Transfer: Level of Care needed not available at this facility    Transfer Requirements: Facility SH   Space available and qualified personnel available for treatment as acknowledged by    [facility] to accept transfer and to provide appropriate medical treatment as acknowledged by       Urszula  Appropriate medical records of the examination and treatment of the patient are provided at the time of transfer   STAFF INITIAL WHEN COMPLETED _______  Transfer will be performed by qualified personnel from    and appropriate transfer equipment as required, including the use of necessary and appropriate life support measures.    Provider Certification: I have examined the patient and explained the following risks and benefits of being transferred/refusing transfer to the  patient/family:  General risk, such as traffic hazards, adverse weather conditions, rough terrain or turbulence, possible failure of equipment (including vehicle or aircraft), or consequences of actions of persons outside the control of the transport personnel, Unanticipated needs of medical equipment and personnel during transport      Based on these reasonable risks and benefits to the patient and/or the unborn child(cate), and based upon the information available at the time of the patient’s examination, I certify that the medical benefits reasonably to be expected from the provision of appropriate medical treatments at another medical facility outweigh the increasing risks, if any, to the individual’s medical condition, and in the case of labor to the unborn child, from effecting the transfer.    X____________________________________________ DATE 07/08/25        TIME_______      ORIGINAL - SEND TO MEDICAL RECORDS   COPY - SEND WITH PATIENT DURING TRANSFER

## 2025-07-08 NOTE — ASSESSMENT & PLAN NOTE
Functions range  - Likely secondary to alcohol use  - Morning labs ordered  - Will continue to monitor  - Denies any abdominal pain nausea vomiting

## 2025-07-08 NOTE — ASSESSMENT & PLAN NOTE
As above under alcohol withdrawal    - Had been drinking large amounts of hard alcohol and large amounts of beer up to 24/days since last 10 days.

## 2025-07-08 NOTE — ED NOTES
Pt utilizing callbell, standing at bedside.  Reports he wants to go outside to smoke a cigarette.   Advised pt that this is not possible, as its against hospital policy. Pt disagreeable at this time. Persistant about going outside.  Reverbalized to pt that he cannot go outside to smoke as a patient. Spoke with MD and Charge RN  about situation. Advised to pt that if he wants to go outside to smoke the IV will be d/c'd and pt will be discharged as AMA.  Md to bedside, advising pt he cannot go outside to smoke, offering patch or nicotine gum.  Pt agreeable to gum at this time. Pt verbalizes need to use restroom. Pt currently ambulates with steady gait.  Pt agreeable to remain in ED at this time.        Saeid West RN  07/08/25 2795

## 2025-07-09 ENCOUNTER — TELEPHONE (OUTPATIENT)
Age: 50
End: 2025-07-09

## 2025-07-09 PROBLEM — H53.8 BLURRED VISION: Status: ACTIVE | Noted: 2025-07-09

## 2025-07-09 LAB
ALBUMIN SERPL BCG-MCNC: 3.7 G/DL (ref 3.5–5)
ALP SERPL-CCNC: 72 U/L (ref 34–104)
ALT SERPL W P-5'-P-CCNC: 46 U/L (ref 7–52)
ANION GAP SERPL CALCULATED.3IONS-SCNC: 8 MMOL/L (ref 4–13)
AST SERPL W P-5'-P-CCNC: 29 U/L (ref 13–39)
BILIRUB SERPL-MCNC: 1.4 MG/DL (ref 0.2–1)
BUN SERPL-MCNC: 7 MG/DL (ref 5–25)
CALCIUM SERPL-MCNC: 8.6 MG/DL (ref 8.4–10.2)
CHLORIDE SERPL-SCNC: 105 MMOL/L (ref 96–108)
CO2 SERPL-SCNC: 25 MMOL/L (ref 21–32)
CREAT SERPL-MCNC: 0.64 MG/DL (ref 0.6–1.3)
ERYTHROCYTE [DISTWIDTH] IN BLOOD BY AUTOMATED COUNT: 12.3 % (ref 11.6–15.1)
GFR SERPL CREATININE-BSD FRML MDRD: 113 ML/MIN/1.73SQ M
GLUCOSE SERPL-MCNC: 89 MG/DL (ref 65–140)
HCT VFR BLD AUTO: 50.7 % (ref 36.5–49.3)
HGB BLD-MCNC: 17 G/DL (ref 12–17)
MAGNESIUM SERPL-MCNC: 2.2 MG/DL (ref 1.9–2.7)
MCH RBC QN AUTO: 30.4 PG (ref 26.8–34.3)
MCHC RBC AUTO-ENTMCNC: 33.5 G/DL (ref 31.4–37.4)
MCV RBC AUTO: 91 FL (ref 82–98)
PHOSPHATE SERPL-MCNC: 2.8 MG/DL (ref 2.7–4.5)
PLATELET # BLD AUTO: 110 THOUSANDS/UL (ref 149–390)
PMV BLD AUTO: 9.7 FL (ref 8.9–12.7)
POTASSIUM SERPL-SCNC: 3.9 MMOL/L (ref 3.5–5.3)
PROT SERPL-MCNC: 5.8 G/DL (ref 6.4–8.4)
RBC # BLD AUTO: 5.6 MILLION/UL (ref 3.88–5.62)
SODIUM SERPL-SCNC: 138 MMOL/L (ref 135–147)
WBC # BLD AUTO: 8.16 THOUSAND/UL (ref 4.31–10.16)

## 2025-07-09 PROCEDURE — 99232 SBSQ HOSP IP/OBS MODERATE 35: CPT

## 2025-07-09 PROCEDURE — 83735 ASSAY OF MAGNESIUM: CPT

## 2025-07-09 PROCEDURE — 84100 ASSAY OF PHOSPHORUS: CPT

## 2025-07-09 PROCEDURE — 80053 COMPREHEN METABOLIC PANEL: CPT

## 2025-07-09 PROCEDURE — 99233 SBSQ HOSP IP/OBS HIGH 50: CPT | Performed by: EMERGENCY MEDICINE

## 2025-07-09 PROCEDURE — 85027 COMPLETE CBC AUTOMATED: CPT

## 2025-07-09 RX ORDER — ONDANSETRON 2 MG/ML
4 INJECTION INTRAMUSCULAR; INTRAVENOUS ONCE
Status: COMPLETED | OUTPATIENT
Start: 2025-07-09 | End: 2025-07-09

## 2025-07-09 RX ORDER — PHENOBARBITAL 64.8 MG/1
64.8 TABLET ORAL ONCE
Status: COMPLETED | OUTPATIENT
Start: 2025-07-09 | End: 2025-07-09

## 2025-07-09 RX ORDER — PHENOBARBITAL SODIUM 130 MG/ML
130 INJECTION, SOLUTION INTRAMUSCULAR; INTRAVENOUS ONCE
Status: COMPLETED | OUTPATIENT
Start: 2025-07-09 | End: 2025-07-09

## 2025-07-09 RX ORDER — HYDROXYZINE HYDROCHLORIDE 25 MG/1
25 TABLET, FILM COATED ORAL ONCE
Status: COMPLETED | OUTPATIENT
Start: 2025-07-09 | End: 2025-07-09

## 2025-07-09 RX ADMIN — THIAMINE HYDROCHLORIDE 500 MG: 100 INJECTION, SOLUTION INTRAMUSCULAR; INTRAVENOUS at 21:10

## 2025-07-09 RX ADMIN — THIAMINE HYDROCHLORIDE 500 MG: 100 INJECTION, SOLUTION INTRAMUSCULAR; INTRAVENOUS at 17:16

## 2025-07-09 RX ADMIN — THIAMINE HYDROCHLORIDE 500 MG: 100 INJECTION, SOLUTION INTRAMUSCULAR; INTRAVENOUS at 12:59

## 2025-07-09 RX ADMIN — SODIUM CHLORIDE, SODIUM GLUCONATE, SODIUM ACETATE, POTASSIUM CHLORIDE, MAGNESIUM CHLORIDE, SODIUM PHOSPHATE, DIBASIC, AND POTASSIUM PHOSPHATE 100 ML/HR: .53; .5; .37; .037; .03; .012; .00082 INJECTION, SOLUTION INTRAVENOUS at 08:02

## 2025-07-09 RX ADMIN — THIAMINE HCL TAB 100 MG 100 MG: 100 TAB at 08:03

## 2025-07-09 RX ADMIN — NICOTINE POLACRILEX 2 MG: 2 GUM, CHEWING BUCCAL at 15:14

## 2025-07-09 RX ADMIN — FOLIC ACID 1 MG: 1 TABLET ORAL at 08:03

## 2025-07-09 RX ADMIN — ONDANSETRON 4 MG: 2 INJECTION INTRAMUSCULAR; INTRAVENOUS at 06:49

## 2025-07-09 RX ADMIN — TRAZODONE HYDROCHLORIDE 50 MG: 50 TABLET ORAL at 23:47

## 2025-07-09 RX ADMIN — AMLODIPINE BESYLATE 10 MG: 10 TABLET ORAL at 08:04

## 2025-07-09 RX ADMIN — PHENOBARBITAL SODIUM 130 MG: 130 INJECTION INTRAMUSCULAR; INTRAVENOUS at 21:11

## 2025-07-09 RX ADMIN — PHENOBARBITAL 64.8 MG: 64.8 TABLET ORAL at 23:47

## 2025-07-09 RX ADMIN — HYDROXYZINE HYDROCHLORIDE 25 MG: 25 TABLET, FILM COATED ORAL at 21:11

## 2025-07-09 RX ADMIN — MULTIPLE VITAMINS W/ MINERALS TAB 1 TABLET: TAB ORAL at 08:03

## 2025-07-09 RX ADMIN — PHENOBARBITAL 64.8 MG: 64.8 TABLET ORAL at 05:55

## 2025-07-09 RX ADMIN — ATENOLOL 25 MG: 25 TABLET ORAL at 08:07

## 2025-07-09 NOTE — ASSESSMENT & PLAN NOTE
The patient's withdrawal is complicated by a h/o sleep apnea warranting medically managed withdrawal.   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 He seems to be manifesting some of this  Current evidence of withdrawal includes tongue fasciculations and tachycardia  We will start SEWS protocol for phenobarbital management and monitoring  Continue supportive care, folate

## 2025-07-09 NOTE — PROGRESS NOTES
07/09/25 1513   Referral Data   Referral Source Physician   Referral Name ED   Referral Reason Drug/Alcohol Abuse   County Information   [facility]   Readmission Root Cause   30 Day Readmission No   Patient Information   Mental Status Alert   Primary Caregiver Self   Support System Immediate family   Mormon/Cultural Requests Islam/Polish   Legal Information   Legal Issues Denies   Health Care Proxy Appointed No   Activities of Daily Living Prior to Admission   Functional Status Independent   Assistive Device No device needed   Living Arrangement Lives with someone;House   Ambulation Independent   Access to Firearms   Access to Firearms No   Income Information   Income Source Self-employed        07/09/25 1513   Patient Intake   Special Needs None   Living Arrangement Lives with someone;House   Can patient return home? Yes   [address] to be Discharge to[address] [address]   Patient's Telephone Number [phone]   Access to Firearms No   Type of Work Self employed, transport/selin, has CDL   Work History Self-employed   School Name 10th grade, stoppped when he moved to  when he was 16 years old   Admission Status   [facility]   Patient History   Presenting Problems Alcohol use d/o   Treatment History Rehab when pt was in his 20s   Currently in Treatment No   Medical Problems hypertension   Legal Issues Denies   Probation/ Name (if applicable) Denies   Substance Abuse Yes (See BH History section for detail)   Crisis Info   Release of Information Signed Yes  (wife)        07/09/25 1513   Patient Intake   Special Needs None   Living Arrangement Lives with someone;House   Can patient return home? Yes   [address] Discharge to[address] [address]   Patient's Telephone Number [phone]   Access to Firearms No   Type of Work Self employed, transport/selin, has CDL   Work History Self-employed   School Name 10th  grade, stoppped when he moved to [place] when he was 16 years old   Admission Status   County of Residence [place]   Patient History   Presenting Problems Alcohol use d/o   Treatment History Rehab when pt was in his 20s   Currently in Treatment No   Medical Problems hypertension   Legal Issues Denies   Probation/ Name (if applicable) Denies   Substance Abuse Yes (See BH History section for detail)   Crisis Info   Release of Information Signed Yes  (wife)        07/09/25 6199   Substance Abuse Addendum Details   History of Withdrawal Symptoms Elevated BP;Other withdrawal symptoms (specify in comment)  (tremors, dizzy)   Medical Complications hypertension   Sober Supports Family, they also drink but not to the point that pt was drinking   Present Treatment rehab in his 20s   Substance Abuse Treatment Hx Past Tx, Inpatient;Past detox;Attends AA/NA   ASAM Level & Criteria Pt meets for 3.5LOC, pt declines, does not feel he needs rehab at this time, explained his plan to continue to engage with AA, which has helped him stay sober, or drink less, for months/years at a time   Stage of Change   Stage of Change Contemplation

## 2025-07-09 NOTE — PROGRESS NOTES
07/09/25 1407   Physical Activity   On average, how many days per week do you engage in moderate to strenuous exercise (like a brisk walk)? 4 days  (was going to the gym when he was sober during the past three weeks)   On average, how many minutes do you engage in exercise at this level? 60 min   Financial Resource Strain   How hard is it for you to pay for the very basics like food, housing, medical care, and heating? Not hard   Housing Stability   In the last 12 months, was there a time when you were not able to pay the mortgage or rent on time? N   In the past 12 months, how many times have you moved where you were living? 0   At any time in the past 12 months, were you homeless or living in a shelter (including now)? N   Transportation Needs   In the past 12 months, has lack of transportation kept you from medical appointments or from getting medications? no   In the past 12 months, has lack of transportation kept you from meetings, work, or from getting things needed for daily living? No   Food Insecurity   Within the past 12 months, you worried that your food would run out before you got the money to buy more. Never true   Within the past 12 months, the food you bought just didn't last and you didn't have money to get more. Never true   Stress   Do you feel stress - tense, restless, nervous, or anxious, or unable to sleep at night because your mind is troubled all the time - these days? 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 More than 3   How often do you get together with friends or relatives? More than 3   How often do you attend Catholic or Hindu services? More than 4  (Attended Catholic every other Sunday, now goes less but still throughout the year but not when he is drinking)   Do you belong to any clubs or organizations such as Catholic groups, unions, fraternal or athletic  groups, or school groups? No   How often do you attend meetings of the clubs or organizations you belong to? Never   Are you , , , , never , or living with a partner?    Intimate Partner Violence   Within the last year, have you been afraid of your partner or ex-partner? No   Within the last year, have you been humiliated or emotionally abused in other ways by your partner or ex-partner? No   Within the last year, have you been kicked, hit, slapped, or otherwise physically hurt by your partner or ex-partner? No   Within the last year, have you been raped or forced to have any kind of sexual activity by your partner or ex-partner? No   Alcohol Use   Q1: How often do you have a drink containing alcohol? 4 or more ti   Q2: How many drinks containing alcohol do you have on a typical day when you are drinking? 10 or more   Q3: How often do you have six or more drinks on one occasion? Daily   Utilities   In the past 12 months has the electric, gas, oil, or water company threatened to shut off services in your home? No   Health Literacy   How often do you need to have someone help you when you read instructions, pamphlets, or other written material from your doctor or pharmacy? Never   Follow-Ups   We make community resources available to all of our patients to assist with everyday needs. We may be able to connect you with those resources. Would you be interested? N

## 2025-07-09 NOTE — ASSESSMENT & PLAN NOTE
Likely unrelated to withdrawal as patient has been noncompliant with antihypertensive meds for past few months.  Defer to primary team for management.

## 2025-07-09 NOTE — PROGRESS NOTES
Progress Note - Hospitalist   Name: Frank Cm 50 y.o. male I MRN: [id number]  Unit/Bed#: 5T DETOX 514-01 I Date of Admission: 7/8/2025   Date of Service: 7/9/2025 I Hospital Day: 1    Assessment & Plan  Alcohol withdrawal syndrome with complication (HCC)  Patient last alcoholic drink was prior to coming to the ED at [place].  Continue with telemetry and continuous pulse oximetry monitoring  Continue with vitals monitoring  Toxicology on board, agree with SEWs protocol   Continue with IV fluids along with thiamine folic acid and multivitamin  Consult to crisis  Consult case management/crisis for assistant with [facility] resources  Toxicology on board appreciate recs  Continue monitor symptoms  Alcohol use disorder, severe, dependence (HCC)  As above under alcohol withdrawal  Had been drinking large amounts of hard alcohol and large amounts of beer up to 24/days since last 10 days.  Blurred vision  Continue on IV thiamine.  Suspect related to wernickes/alcohol abuse  Transaminitis  Mildly elevated  Likely secondary to alcohol use  Trend CMP  Normalizing today.  Tobacco dependence  Smoking cessation and counseling done  Prefers nicotine gum ordered  Hypertension  Patient states he has not been compliant with his hypertensive medication  Initiated prior to admission amlodipine and atenolol.    VTE Pharmacologic Prophylaxis:   Moderate Risk (Score 3-4) - Pharmacological DVT Prophylaxis Contraindicated. Sequential Compression Devices Ordered.    Mobility:   Basic Mobility Inpatient Raw Score: 24  JH-HLM Goal: 8: Walk 250 feet or more  JH-HLM Achieved: 8: Walk 250 feet ot more  JH-HLM Goal achieved. Continue to encourage appropriate mobility.    Patient Centered Rounds: I performed bedside rounds with nursing staff today.   Discussions with Specialists or Other Care Team Provider: JULITO    Education and Discussions with Family / Patient: Patient declined call to .     Current Length of Stay: 1  day(s)  Current Patient Status: Inpatient   Certification Statement: The patient will continue to require additional inpatient hospital stay due to detox  Discharge Plan: Anticipate discharge tomorrow to home.    Code Status: Level 1 - Full Code    Subjective   Patient has some dizziness/blurred vision. Start IV thiamine    Objective :  Temp:  [96.8 °F (36 °C)-98.4 °F (36.9 °C)] 96.8 °F (36 °C)  HR:  [75-92] 79  BP: (132-166)/() 133/86  Resp:  [16-18] 18  SpO2:  [90 %-95 %] 92 %  O2 Device: None (Room air)    Body mass index is 34.46 kg/m².     Input and Output Summary (last 24 hours):     Intake/Output Summary (Last 24 hours) at 7/9/2025 1302  Last data filed at 7/9/2025 0845  Gross per 24 hour   Intake 900 ml   Output --   Net 900 ml       Physical Exam  Vitals reviewed.   Constitutional:       General: He is not in acute distress.     Appearance: Normal appearance. He is not toxic-appearing.   HENT:      Head: Normocephalic and atraumatic.     Eyes:      General: No scleral icterus.      Cardiovascular:      Rate and Rhythm: Normal rate and regular rhythm.      Heart sounds: Normal heart sounds.   Pulmonary:      Effort: Pulmonary effort is normal. No respiratory distress.      Breath sounds: Normal breath sounds. No stridor.   Abdominal:      General: Abdomen is flat. Bowel sounds are normal. There is no distension.      Palpations: Abdomen is soft.     Musculoskeletal:         General: Normal range of motion.      Cervical back: Normal range of motion.      Right lower leg: No edema.      Left lower leg: No edema.     Skin:     Coloration: Skin is not jaundiced or pale.     Neurological:      General: No focal deficit present.      Mental Status: He is alert and oriented to person, place, and time. Mental status is at baseline.         Telemetry:  Telemetry Orders (From admission, onward)               24 Hour Telemetry Monitoring  Continuous x 24 Hours (Telem)        Expiring   Question:  Reason for 24  Hour Telemetry  Answer:  Drug overdose known to cause cardiac arrhythmias (e.g. - Cocaine, TCA, Amphetamines, Phenothiazines, Digoxin, etc.) Meds known to need cardiac monitoring: Amiodarone, Dopamine, Dobutamine, Phenytoin                     Telemetry Reviewed: Normal Sinus Rhythm  Indication for Continued Telemetry Use: Metabolic/electrolyte disturbance with high probability of dysrhythmia               Lab Results: I have reviewed the following results:   Results from last 7 days   Lab Units 07/09/25  0535 07/08/25  0333   WBC Thousand/uL 8.16 10.50*   HEMOGLOBIN g/dL 17.0 19.2*   HEMATOCRIT % 50.7* 53.2*   PLATELETS Thousands/uL 110* 149   SEGS PCT %  --  44   LYMPHO PCT %  --  45*   MONO PCT %  --  8   EOS PCT %  --  2     Results from last 7 days   Lab Units 07/09/25  0535   SODIUM mmol/L 138   POTASSIUM mmol/L 3.9   CHLORIDE mmol/L 105   CO2 mmol/L 25   BUN mg/dL 7   CREATININE mg/dL 0.64   ANION GAP mmol/L 8   CALCIUM mg/dL 8.6   ALBUMIN g/dL 3.7   TOTAL BILIRUBIN mg/dL 1.40*   ALK PHOS U/L 72   ALT U/L 46   AST U/L 29   GLUCOSE RANDOM mg/dL 89     Results from last 7 days   Lab Units 07/08/25  0333   INR  0.88             Results from last 7 days   Lab Units 07/08/25  0502   LACTIC ACID mmol/L 2.0       Recent Cultures (last 7 days):         Imaging Results Review: No pertinent imaging studies reviewed.  Other Study Results Review: No additional pertinent studies reviewed.    Last 24 Hours Medication List:     Current Facility-Administered Medications:     amLODIPine (NORVASC) tablet 10 mg, Daily    atenolol (TENORMIN) tablet 25 mg, Daily    enoxaparin (LOVENOX) subcutaneous injection 40 mg, Daily    folic acid (FOLVITE) tablet 1 mg, Daily    multi-electrolyte (Plasmalyte-A/Isolyte-S PH 7.4/Normosol-R) IV solution, Continuous, Last Rate: 100 mL/hr (07/09/25 0802)    multivitamin-minerals (CENTRUM) tablet 1 tablet, Daily    nicotine polacrilex (NICORETTE) gum 2 mg, Q2H PRN    thiamine (VITAMIN B1) 500 mg in  dextrose 5 % 100 mL IVPB, TID, Last Rate: 500 mg (07/09/25 1259)    traZODone (DESYREL) tablet 50 mg, HS PRN    Administrative Statements   Today, Patient Was Seen By: Ysabel Boston PA-C    **Please Note: This note may have been constructed using a voice recognition system.**

## 2025-07-09 NOTE — ASSESSMENT & PLAN NOTE
Patient last alcoholic drink was prior to coming to the ED at [place].  Continue with telemetry and continuous pulse oximetry monitoring  Continue with vitals monitoring  Toxicology on board, agree with SEWs protocol   Continue with IV fluids along with thiamine folic acid and multivitamin  Consult to crisis  Consult case management/crisis for assistant with [facility] resources  Toxicology on board appreciate recs  Continue monitor symptoms

## 2025-07-09 NOTE — TELEPHONE ENCOUNTER
Attempted to contact Giuliana at the number listed below with PCP's advisement. Unable to LVM as the phone continues to ring. Please make another outreach attempt tomorrow.

## 2025-07-09 NOTE — ASSESSMENT & PLAN NOTE
No nystagmus appreciated on exam but considering patient has extensive AUD history and was drinking excessively without eating there is a concern for thiamine deficiency and or wernicke encephalopathy.   Initiate high dose thiamine 3 times daily x3 days.

## 2025-07-09 NOTE — TELEPHONE ENCOUNTER
Intramuscular? Yes  We can I would ask that this can be started at the facility as sometimes getting the medication from the pharmacy can take a couple weeks and would not want there to be a lapse in coverage

## 2025-07-09 NOTE — PROGRESS NOTES
Progress Note - Medical Toxicology   Name: Frank Cm 50 y.o. male I MRN: [id number]  Unit/Bed#: 5T DETOX 514-01 I Date of Admission: 7/8/2025   Date of Service: 7/9/2025 I Hospital Day: 1    Assessment & Plan  Alcohol withdrawal syndrome with complication (HCC)  The patient's withdrawal is complicated by a h/o sleep apnea warranting medically managed withdrawal.   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 He seems to be manifesting some of this  Current evidence of withdrawal includes tongue fasciculations and tachycardia  We will start SEWS protocol for phenobarbital management and monitoring  Continue supportive care, folate  Tobacco dependence  NRT while in the hospital  4 minutes of cessation counseling provided  Alcohol use disorder, severe, dependence (HCC)  CM/CRS involved for aftercare planning and linkage to ongoing AUD treatment after discharge  Will discuss naltrexone for JULIO with the patient prior to discharge.   Hypertension  Likely unrelated to withdrawal as patient has been noncompliant with antihypertensive meds for past few months.  Defer to primary team for management.   Transaminitis  Recent Labs     07/08/25  0333 07/09/25  0535   AST 46* 29   ALT 69* 46   TBILI 0.76 1.40*     Elevated as above  Physical exam shows no jaundice, scleral icertus, asterixis, encephalopathy, bruising  Avoid hepatotoxic medications  Imaging as needed   Blurred vision  No nystagmus appreciated on exam but considering patient has extensive AUD history and was drinking excessively without eating there is a concern for thiamine deficiency and or wernicke encephalopathy.   Initiate high dose thiamine 3 times daily x3 days.     Reason for current consult: alcohol withdrawal syndrome     Subjective   Patient evaluated at bedside. He reports he still feels dizzy and is experiencing blurry vision. He also is experiencing anxiety and mild diaphoresis.  Patient is unsure what he wants in regard to aftercare plan but is not interested in outpatient rehab.   He feels his recent binge was related to stressors in his family.     Objective :  Temp:  [96.8 °F (36 °C)-98.8 °F (37.1 °C)] 96.8 °F (36 °C)  HR:  [] 79  BP: (125-166)/() 133/86  Resp:  [16-18] 18  SpO2:  [90 %-95 %] 92 %  O2 Device: None (Room air)      Intake/Output Summary (Last 24 hours) at 7/9/2025 1212  Last data filed at 7/9/2025 0845  Gross per 24 hour   Intake 900 ml   Output --   Net 900 ml       Physical Exam  Vitals and nursing note reviewed.   Constitutional:       General: He is not in acute distress.     Appearance: He is well-developed.   HENT:      Head: Normocephalic and atraumatic.     Eyes:      Conjunctiva/sclera: Conjunctivae normal.       Cardiovascular:      Rate and Rhythm: Normal rate and regular rhythm.      Heart sounds: No murmur heard.  Pulmonary:      Effort: Pulmonary effort is normal. No respiratory distress.      Breath sounds: Normal breath sounds.   Abdominal:      Palpations: Abdomen is soft.      Tenderness: There is no abdominal tenderness.     Musculoskeletal:         General: No swelling.      Cervical back: Neck supple.     Skin:     General: Skin is warm and dry.      Capillary Refill: Capillary refill takes less than 2 seconds.      Comments: Appears flushed     Neurological:      Mental Status: He is alert and oriented to person, place, and time.     Psychiatric:         Mood and Affect: Mood normal.           Lab Results: I have reviewed the following results:CBC/BMP:   .     07/09/25  0535   WBC 8.16   HGB 17.0   HCT 50.7*   *   SODIUM 138   K 3.9      CO2 25   BUN 7   CREATININE 0.64   GLUC 89   MG 2.2   PHOS 2.8    , LFTs:   .     07/09/25  0535   AST 29   ALT 46   ALB 3.7   TBILI 1.40*   ALKPHOS 72        Imaging Results Review: I reviewed radiology reports from this admission including: CT abdomen/pelvis.  Other Study Results Review: EKG  was reviewed.     Administrative Statements   I have spent a total time of 25 minutes in caring for this patient on the day of the visit/encounter including Diagnostic results, Prognosis, Risks and benefits of tx options, Instructions for management, Patient and family education, Importance of tx compliance, Risk factor reductions, Impressions, Counseling / Coordination of care, Documenting in the medical record, Reviewing/placing orders in the medical record (including tests, medications, and/or procedures), Obtaining or reviewing history  , and Communicating with other healthcare professionals .

## 2025-07-09 NOTE — ASSESSMENT & PLAN NOTE
As above under alcohol withdrawal  Had been drinking large amounts of hard alcohol and large amounts of beer up to 24/days since last 10 days.

## 2025-07-09 NOTE — DISCHARGE INSTR - OTHER ORDERS
Nessa Schwartz   Certified     [facility] [place] and [facility]  [phone]  Monday-Friday 6:45am-3:15pm                DRUG AND ALCOHOL RESOURCES IN [facility]    If you have health insurance, including medical assistance, there should be a phone number on your insurance card that you can call to find out how to access services. The card may say, “For Behavioral Health Services” or “For Drug and Alcohol Services” or “For Substance Abuse Services” call the number provided. Or call [place] Get Help Now at [phone]    [facility] Drug and Alcohol  For information on how to access Drug and Alcohol treatment services please contact:  After hours 24/7 number: [facility] Referral Center at [phone]  During regular business hours call:   [facility] Toll Free: [phone]   [facility]: [phone]  [facility] Outpatient: [phone]  [facility] [phone]    [facility] National Helpline  Confidential free help, from public health agencies, to find substance use treatment and information.  1-756.235.3857    12 step Meetings      [facility] [phone]  [facility]: [phone]  [place] meeting list can be found at [email or web address]  [place] meeting list can be found at [email or web address]    Outpatient Drug & Alcohol Treatment   The [facility] currently operates an outpatient treatment unit:  [facility] in [place], [place] as a functional unit of the [facility]  The Outpatient treatment units are licensed by the [place] Department of Drug & Alcohol Programs to provide individual and group counseling for those with substance abuse and dependency problems. The clinical staff is experienced in a variety of therapeutic modalities and provides treatment  that is individualized to meet the particular needs of each person. These units are drug-free treatment programs.  The [facility] accepts most major healthcare insurance coverage plans, [place] Medical Assistance and in those cases where the consumer has no third party healthcare coverage, a liberal sliding fee schedule is utilized. The length of service and type of outpatient service provided is based on the results of the Level of Care Assessment            There are currently three treatment protocols available:  Outpatient  Intensive Outpatient  Contracted services for Partial Hospitalization  Therapy is provided in both Individual and Group counseling formats. The Outpatient department offers individual counseling for the family members of substance abusers to address co-dependent and enabling behaviors.    Outpatient treatment services in [facility] are purchased through a fee-for-service subcontract with:  [place] [facility]  [address] [place] [id number]   Phone: [phone]    [facility] Outpatient  [address]  [address]  [place] [address]  Phone: [phone]  Fax: [phone]     Outpatient treatment services in [facility] are purchased through fee-for-service subcontracts with:  [facility]   [address]  [place], [place] [address]  Phone: [phone]  [facility] Outpatient  [address]  [address]  [place] [address]  Phone: [phone]  Fax: [phone]   Outpatient treatment services are also available to [facility] residents in our [facility] Office.    24/7 Mental Health Crisis Hotline  [facility]  [phone]  [facility] Toll Free: [phone]

## 2025-07-09 NOTE — CERTIFIED RECOVERY SPECIALIST
Certified  Note      Name: Frank Cm 50 y.o. male I MRN: [id number]  Unit/Bed#: 5T DETOX 514-01 I Date of Admission: 7/8/2025   Date of Service: 7/9/2025 I Hospital Day: 1    Summary of Contact   CRS made introduction to patient - Patient request CRS to come back at a later time. CRS going to other campus and will meet with patient tomorrow     Follow up: 07/10/2025             Administrative Statements  I have spent a total time of 0 minutes in caring for this patient on the day of the visit/encounter.    Nessa Schwartz

## 2025-07-09 NOTE — UTILIZATION REVIEW
"Initial Clinical Review    Pt initially presented to [facility] ED. Pt was transferred by EMS to [facility] for medically managed detox.    Admission: Date/Time/Statement:   Admission Orders (From admission, onward)       Ordered        07/08/25 1308  Inpatient Admission  Once                          Orders Placed This Encounter   Procedures    Inpatient Admission     Standing Status:   Standing     Number of Occurrences:   1     Level of Care:   Med Surg [16]     Estimated length of stay:   More than 2 Midnights     Certification:   I certify that inpatient services are medically necessary for this patient for a duration of greater than two midnights. See H&P and MD Progress Notes for additional information about the patient's course of treatment.        Initial Presentation: 50 y.o. male who presented by EMS to [facility] as a direct admission.  Pertinent PMHx: AUD, HTN.  Inpatient admission for evaluation and treatment of alcohol withdrawal syndrome. Presented w/ anxiety, tremors, chest tightness, palpitations, HTN. Serum ETOH: 351. Reports \"large amounts\" of hard alcohol and up to 24 beers daily, last drink on 7/8 just prior to presentation. Exam: tremors, tachycardic, tongue fasciculations. SEWS: 5. Plan:  IVF, telemetry, continuous pulse ox, SEWS monitoring w/ phenobarbital management, thiamine/folic acid supplement, NRT, continue PTA meds, trend labs, replete electrolytes as needed; I&O, fall & seizure precautions. Toxicology consulted.     Toxicology: Presented w/ need for detox from alcohol.  Concern for kindling phenomenon d/t binge and sober pattern of drinking. Plan: SEWS monitoring w/ phenobarbital management, PO thiamine/folic acid supplement.     Date: 07/09/25       Day 2: Pt reports dizziness and blurred vision.  On exam, tachycardic at times, anxiety. Telemetry: NSR. Plt 110. SEWS 6. Plan: continue SEWS monitoring w/ phenobarbital management, change to IV " thiamine/folic acid supplement, telemetry, continuous pulse ox, continue current meds, trend labs, replete electrolytes as needed. Received IV phenobarbital 390 mg and PO pheno 64.8 mg x3 since admission.        Scheduled Medications:  amLODIPine, 10 mg, Oral, Daily  atenolol, 25 mg, Oral, Daily  enoxaparin, 40 mg, Subcutaneous, Daily  folic acid, 1 mg, Oral, Daily  multivitamin-minerals, 1 tablet, Oral, Daily  thiamine, 100 mg, Oral, Daily    Continuous IV Infusions:  multi-electrolyte, 100 mL/hr, Intravenous, Continuous    PRN Meds:  nicotine polacrilex, 2 mg, Oral, Q2H PRN; 7/8 x1  ondansetron, 4 mg, Intravenous, 7/9 x1  traZODone, 50 mg, Oral, HS PRN; 7/8 x1  phenobarbital, 260 mg, Intravenous; 7/8 x1  phenobarbital, 130 mg, Intravenous; 7/8 x1  phenobarbital, 64.8 mg, Oral; 7/8 x3, 7/9 x1    ED Triage Vitals [07/08/25 1245]   Temperature Pulse Respirations Blood Pressure SpO2 Pain Score   98.8 °F (37.1 °C) (!) 116 18 125/97 94 % 1     Weight (last 2 days)       Date/Time Weight    07/08/25 1245 99.8 (220)              Vital Signs (last 3 days)       Date/Time Temp Pulse Resp BP MAP (mmHg) SpO2 O2 Device Patient Position - Orthostatic VS Larsen Coma Scale Score SEWS Total Score Pain    07/09/25 1458 97.8 °F (36.6 °C) 89 18 137/89 111 92 % None (Room air) Lying -- -- --    07/09/25 1134 -- -- -- -- -- -- -- -- -- 0 --    07/09/25 1106 96.8 °F (36 °C) 79 18 133/86 101 92 % None (Room air) Sitting -- -- --    07/09/25 0805 -- -- -- -- -- -- -- -- 15 6 No Pain    07/09/25 0729 97.8 °F (36.6 °C) 83 18 142/95 110 95 % None (Room air) Lying -- -- --    07/09/25 0540 -- -- -- -- -- -- -- -- -- 6 --    07/09/25 0537 97.1 °F (36.2 °C) 75 18 160/107 124 91 % None (Room air) Lying -- -- --    07/09/25 0012 -- -- -- -- -- -- -- -- -- 0 --    07/09/25 0002 97.5 °F (36.4 °C) 84 17 158/87 110 94 % None (Room air) Lying -- -- --    07/08/25 2210 97.8 °F (36.6 °C) 75 16 150/90 -- 94 % None (Room air) -- -- 0 --    07/08/25 9436  -- -- -- -- -- -- -- -- -- 9 --    07/08/25 2145 97 °F (36.1 °C) 82 16 149/116 -- 93 % None (Room air) -- 15 -- No Pain    07/08/25 2000 -- -- -- -- -- -- -- -- -- 5 --    07/08/25 1915 97.5 °F (36.4 °C) 92 16 166/95 118 92 % None (Room air) Sitting -- -- --    07/08/25 1815 -- -- -- -- -- -- -- -- -- 0 --    07/08/25 1600 -- -- -- -- -- -- -- -- -- 2 --    07/08/25 1547 98.4 °F (36.9 °C) 92 16 132/85 100 90 % None (Room air) Lying -- -- --    07/08/25 1435 -- -- -- -- -- -- -- -- 15 -- --    07/08/25 1313 -- -- -- -- -- -- -- -- -- 5 --    07/08/25 1245 98.8 °F (37.1 °C) 116 18 125/97 -- 94 % None (Room air) Sitting -- -- 1            SEWS:   Row Name 07/09/25 0805 07/09/25 0540 07/09/25 0012 07/08/25 2210 07/08/25 2146   Severity of Ethanol Withdrawal Scale (SEWS)   ANXIETY: Do you feel that something bad is about to happen to you right now? 3  -LB 0  -TS 0  -TS 0  -TS 3  -TS   NAUSEA and DRY HEAVES or VOMITING? 0  -LB 3  -TS 0  -TS 0  -TS 3  -TS   SWEATING: (includes moist palms, sweating now)? Score 0 or 2 0  -LB 0  -TS 0  -TS 0  -TS 0  -TS   TREMOR: with arms extended eyes closed? 0  -LB 0  -TS 0  -TS 0  -TS 0  -TS   AGITATION: Fidgety, restless, pacing? 0  -LB 0  -TS 0  -TS 0  -TS 0  -TS   DISORIENTATION: 0  -LB 0  -TS 0  -TS 0  -TS 0  -TS   HALLUCINATIONS: 0  -LB 0  -TS 0  -TS 0  -TS 0  -TS   VITAL SIGNS: ANY (Pulse >110, Diastolic BP >90, Temp >99.6) 3  -LB 3  -TS 0  -TS 0  -TS 3  -TS   SEWS Total Score 6  -LB 6  -TS 0  -TS 0  -TS 9  -TS   Guaman Agitation Sedation Scale (RASS)   Gu[place] Agitation Sedation Scale (RASS) 0  -LB 0  -TS -1  -TS -1  -TS +1  -TS   Row Name 07/08/25 2000 07/08/25 1815 07/08/25 1600 07/08/25 1313 07/08/25 1300   Severity of Ethanol Withdrawal Scale (SEWS)   ANXIETY: Do you feel that something bad is about to happen to you right now? 0  -TS 0  -CN 0  -CN 0  -CN --  -CN   NAUSEA and DRY HEAVES or VOMITING? 0  -TS 0  -CN 0  -CN 0  -CN --  -CN   SWEATING: (includes moist palms,  sweating now)? Score 0 or 2 2  -TS 0  -CN 0  -CN 0  -CN --  -CN   TREMOR: with arms extended eyes closed? 0  -TS 0  -CN 2  -CN 2  -CN --  -CN   AGITATION: Fidgety, restless, pacing? 0  -TS 0  -CN 0  -CN 0  -CN --  -CN   DISORIENTATION: 0  -TS 0  -CN 0  -CN 0  -CN --  -CN   HALLUCINATIONS: 0  -TS 0  -CN 0  -CN 0  -CN --  -CN   VITAL SIGNS: ANY (Pulse >110, Diastolic BP >90, Temp >99.6) 3  -TS 0  -CN 0  -CN 3  -CN --   SEWS Total Score 5  -TS 0  -CN 2  -CN 5  -CN --   [place] Agitation Sedation Scale (RASS)   [place] Agitation Sedation Scale (RASS) 0  -TS -1  -CN -1  -CN 0  -CN 0  -CN       Pertinent Labs/Diagnostic Test Results:    Results from last 7 days   Lab Units 07/09/25  0535 07/08/25  0333   WBC Thousand/uL 8.16 10.50*   HEMOGLOBIN g/dL 17.0 19.2*   HEMATOCRIT % 50.7* 53.2*   PLATELETS Thousands/uL 110* 149   TOTAL NEUT ABS Thousands/µL  --  4.71         Results from last 7 days   Lab Units 07/09/25  0535 07/08/25  0333   SODIUM mmol/L 138 140   POTASSIUM mmol/L 3.9 3.8   CHLORIDE mmol/L 105 103   CO2 mmol/L 25 26   ANION GAP mmol/L 8 11   BUN mg/dL 7 5   CREATININE mg/dL 0.64 0.68   EGFR ml/min/1.73sq m 113 110   CALCIUM mg/dL 8.6 8.6   MAGNESIUM mg/dL 2.2 2.3   PHOSPHORUS mg/dL 2.8 3.1     Results from last 7 days   Lab Units 07/09/25  0535 07/08/25  0333   AST U/L 29 46*   ALT U/L 46 69*   ALK PHOS U/L 72 85   TOTAL PROTEIN g/dL 5.8* 6.7   ALBUMIN g/dL 3.7 4.2   TOTAL BILIRUBIN mg/dL 1.40* 0.76         Results from last 7 days   Lab Units 07/09/25  0535 07/08/25  0333   GLUCOSE RANDOM mg/dL 89 98      Results from last 7 days   Lab Units 07/08/25  0333   PH JAK  7.470*   PCO2 JAK mm Hg 38.1*   PO2 JAK mm Hg 84.3*   HCO3 JAK mmol/L 27.1   BASE EXC JAK mmol/L 3.5   O2 CONTENT JAK ml/dL 24.0   O2 HGB, VENOUS % 86.2*         Results from last 7 days   Lab Units 07/08/25  0333   CK TOTAL U/L 139     Results from last 7 days   Lab Units 07/08/25  0545 07/08/25  0333   HS TNI 0HR ng/L  --  8   HS TNI 2HR ng/L 7   --    HSTNI D2 ng/L -1  --          Results from last 7 days   Lab Units 07/08/25  0333   PROTIME seconds 12.6   INR  0.88   PTT seconds 25             Results from last 7 days   Lab Units 07/08/25  0502   LACTIC ACID mmol/L 2.0       Results from last 7 days   Lab Units 07/08/25  0333   LIPASE u/L 70     Results from last 7 days   Lab Units 07/08/25  0501   CLARITY UA  Clear   COLOR UA  Colorless   SPEC GRAV UA  1.002*   PH UA  7.0   GLUCOSE UA mg/dl Negative   KETONES UA mg/dl Negative   BLOOD UA  Negative   PROTEIN UA mg/dl Negative   NITRITE UA  Negative   BILIRUBIN UA  Negative   UROBILINOGEN UA (BE) mg/dl <2.0   LEUKOCYTES UA  Negative     Results from last 7 days   Lab Units 07/08/25  0333   ETHANOL LVL mg/dL 351*   ACETAMINOPHEN LVL ug/mL <2*   SALICYLATE LVL mg/dL <5*         Past Medical History[1]  Present on Admission:   Alcohol withdrawal syndrome with complication (HCC)   Alcohol use disorder, severe, dependence (HCC)   Tobacco dependence   Hypertension      Admitting Diagnosis: Alcohol withdrawal (HCC) [F10.939]  Age/Sex: 50 y.o. male      SEWS PHENOBARBITAL PROTOCOL FOR ALCOHOL WITHDRAWAL  Admit patient to medical detox unit and continue supportive care and stabilization of acute ethanol withdrawal per medical toxicology/detox treatment pathway. Monitor ethanol withdrawal severity via the Severity of Ethanol Withdrawal Scale (SEWS) Q4 hours and then hourly if/when SEWS > 6. Treat withdrawal per pathway and reassess Q30-60 minutes.          Mild SEWS Score 1-6  Administer medications* (IV or PO; PO preferred):  If initial SEWS score: diazepam 10mg PO/IV x 1 AND phenobarbital 65 mg PO/IV x 1  If repeat SEWS score 1-6: phenobarbital 65 mg PO/IV q1 hour x 5 doses maximum   Reassessment:   SEWS q1 hour after each dose until SEWS 0 x 2 hours  VS q1 hours (until SEWS 0, then q4 hours)  Notify provider for bedside evaluation if 5-dose maximum is reached, RASS of -3 to -5, or SEWS score escalates to moderate  or severe.   Moderate SEWS Score 7-12  Administer medications* (IV):  If initial SEWS score: diazepam 10mg IV x 1 AND phenobarbital 260 mg IV x 1  If repeat SEWS score 7-12 or score escalated from mild: phenobarbital 130 mg IV q30 minutes x 5 doses maximum   Reassessment:  SEWS q30 minutes after each dose until SEWS < 7 (then hourly until SEWS 0 x 2 hours)  VS q30 minutes until SEWS < 7 (then hourly until SEWS 0, then q4 hours)  Notify provider for bedside evaluation if 5-dose maximum is reached, RASS of -3 to -5, or SEWS score escalates to severe.   Severe SEWS Score >= 13  Administer medications* (IV):  If initial SEWS score: Diazepam 10 mg IV x 1 AND phenobarbital 650 mg IV piggyback x 1 over 15-30 minutes  If repeat SEWS score >= 13 or score escalated from mild or moderate: phenobarbital 130 mg IV q30 minutes x 5 doses maximum   Reassessment:  SEWS q30 minutes after each dose until SEWS < 7 (then hourly until SEWS 0 x 2 hours)   VS q30 minutes until SEWS < 7 (then hourly until SEWS 0, then q4 hours)  Notify provider for bedside evaluation if 5-dose maximum is reached or RASS of -3 to -5   *Hold medications and notify provider if CNS depression, respirations < 10/min, or RASS of -3 to -5.        Network Utilization Review Department  ATTENTION: Please call with any questions or concerns to [phone] and carefully listen to the prompts so that you are directed to the right person. All voicemails are confidential.   For Discharge needs, contact Care Management DC Support Team at [phone] opt. 2  Send all requests for admission clinical reviews, approved or denied determinations and any other requests to dedicated fax number below belonging to the campus where the patient is receiving treatment. List of dedicated fax numbers for the Facilities:  FACILITY NAME UR FAX NUMBER   ADMISSION DENIALS (Administrative/Medical Necessity) [phone]   DISCHARGE SUPPORT TEAM (NETWORK) [phone]   [facility]  (Maternity/NICU/Pediatrics) [phone]   [facility] [phone]   [facility] [phone]   [facility] [phone]   [facility] [phone]   [facility] [phone]   [facility] [phone]   [facility] [phone]   [facility] [phone]   [facility] [phone]   [facility] [phone]   [facility] [phone]   [facility] [phone]              [1]   Past Medical History:  Diagnosis Date    Ear problems     Hypertension     Nosebleed

## 2025-07-09 NOTE — ASSESSMENT & PLAN NOTE
Recent Labs     07/08/25  0333 07/09/25  0535   AST 46* 29   ALT 69* 46   TBILI 0.76 1.40*     Elevated as above  Physical exam shows no jaundice, scleral icertus, asterixis, encephalopathy, bruising  Avoid hepatotoxic medications  Imaging as needed

## 2025-07-09 NOTE — CASE MANAGEMENT
CM called pt's primary care office to determine if pt wants to start naltrexone here, will PCP office continue to prescribe, message sent to PCP team, they will confirm with CM.

## 2025-07-09 NOTE — PLAN OF CARE
Problem: PAIN - ADULT  Goal: Verbalizes/displays adequate comfort level or baseline comfort level  Description: Interventions:  - Encourage patient to monitor pain and request assistance  - Assess pain using appropriate pain scale  - Administer analgesics as ordered based on type and severity of pain and evaluate response  - Implement non-pharmacological measures as appropriate and evaluate response  - Consider cultural and social influences on pain and pain management  - Notify physician/advanced practitioner if interventions unsuccessful or patient reports new pain  - Educate patient/family on pain management process including their role and importance of  reporting pain   - Provide non-pharmacologic/complimentary pain relief interventions  Outcome: Progressing     Problem: SAFETY ADULT  Goal: Patient will remain free of falls  Description: INTERVENTIONS:  - Educate patient/family on patient safety including physical limitations  - Instruct patient to call for assistance with activity   - Consider consulting OT/PT to assist with strengthening/mobility based on AM PAC & JH-HLM score  - Consult OT/PT to assist with strengthening/mobility   - Keep Call bell within reach  - Keep bed low and locked with side rails adjusted as appropriate  - Keep care items and personal belongings within reach  - Initiate and maintain comfort rounds  - Make Fall Risk Sign visible to staff  - Obtain necessary fall risk management equipment:   - Apply yellow socks and bracelet for high fall risk patients  - Consider moving patient to room near nurses station  Outcome: Progressing  Goal: Maintain or return to baseline ADL function  Description: INTERVENTIONS:  -  Assess patient's ability to carry out ADLs; assess patient's baseline for ADL function and identify physical deficits which impact ability to perform ADLs (bathing, care of mouth/teeth, toileting, grooming, dressing, etc.)  - Assess/evaluate cause of self-care deficits   -  Assess range of motion  - Assess patient's mobility; develop plan if impaired  - Assess patient's need for assistive devices and provide as appropriate  - Encourage maximum independence but intervene and supervise when necessary  - Involve family in performance of ADLs  - Assess for home care needs following discharge   - Consider OT consult to assist with ADL evaluation and planning for discharge  - Provide patient education as appropriate  - Monitor functional capacity and physical performance, use of AM PAC & JH-HLM   - Monitor gait, balance and fatigue with ambulation    Outcome: Progressing  Goal: Maintains/Returns to pre admission functional level  Description: INTERVENTIONS:  - Perform AM-PAC 6 Click Basic Mobility/ Daily Activity assessment daily.  - Set and communicate daily mobility goal to care team and patient/family/caregiver.   - Collaborate with rehabilitation services on mobility goals if consulted  Problem: DISCHARGE PLANNING  Goal: Discharge to home or other facility with appropriate resources  Description: INTERVENTIONS:  - Identify barriers to discharge w/patient and caregiver  - Arrange for needed discharge resources and transportation as appropriate  - Identify discharge learning needs (meds, wound care, etc.)  - Arrange for interpretive services to assist at discharge as needed  - Refer to Case Management Department for coordinating discharge planning if the patient needs post-hospital services based on physician/advanced practitioner order or complex needs related to functional status, cognitive ability, or social support system  Outcome: Progressing     Problem: Knowledge Deficit  Goal: Patient/family/caregiver demonstrates understanding of disease process, treatment plan, medications, and discharge instructions  Description: Complete learning assessment and assess knowledge base.  Interventions:  - Provide teaching at level of understanding  - Provide teaching via preferred learning  methods  Outcome: Progressing     - Record patient progress and toleration of activity level   Outcome: Progressing

## 2025-07-09 NOTE — ASSESSMENT & PLAN NOTE
"Dermatology Rooming Note    Tony Mcnair's goals for this visit include:   Chief Complaint   Patient presents with     Derm Problem     Tony is here today for psoriasis. He states \" phototherapy is keeping my skin in check\"           MAYRA Ivy    " Continue on IV thiamine.  Suspect related to wernickes/alcohol abuse

## 2025-07-09 NOTE — TELEPHONE ENCOUNTER
Giuliana from [facility] called to follow up and ask if the provider would be able to prescribe Naltrexone for the patient? The patient is considering starting this medication and supposed to be discharged tomorrow.     Giuliana would like a call back to discuss. [phone]      Please advise, Thank you

## 2025-07-09 NOTE — CASE MANAGEMENT
Pt's name, date of birth, home address and telephone number were verified. Pt was informed of case management role and the purpose of the completion of intake with case management.  Pt signed MARV for blue [employer] insurance, [employer], and his wife/emergency contact. Completed recovery plan.     SUBSTANCE ABUSE    Stressor/Trigger    Pt presented to [facility] ED due to concerns with chest tightness, tremors, palpitations, anxiety   UDS:   Audit:   PAWSS:  Nicotine: 3 PPD  Ethanol:   Prior D&A treatment   When he was in his 20s when to rehab in [place]   AA/NA Meetings   Attends AA meetings online and in person, had a sponsor when he was younger   Withdrawal  Symptoms   Dizzy, nausea   History of OD/blackouts or Withdrawal Seizures   Hx of blackouts including multiple blackouts in the past 10 days, denies seizures, denies OD   MENTAL HEALTH INFORMATION    Hx of Mental Health Dx   Denies   Outpatient Mental Health Tx   Denies   Inpatient Hospitalizations (Mental Health)   Denies   Family History of Mental Health    Father used to drink when father was young  Maternal/Paternal Uncles-alcohol   Trauma History    Denies   Current MH Symptoms   Denies any current SI/SH/HI denies hx of this   Access to Firearms    Wife owns guns, son owns guns, and they are both locked up separately in safes   PHYSICAL HEALTH INFORMATION    Physical Health Conditions (Chronic)   Hypertension-improved when he is not drinking   PCP   [facility]   Insurance   [employer]   Preferred Pharmacy      LEGAL ISSUES    Past or present legal issues   denies   Probation/[place]   denies   EMPLOYMENT/INCOME/NEEDS    Education   Until he was 16 when he moved to [place] from [place], about 10th grade    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      History   Denies   Spiritual/Confucianism Beliefs   [employer]   Transportation/Transport Home   License/car   DEMOGRAPHICS    Discharge Address    [address] [place] [address]     "  Living Arrangement   Lives with wife and children   Can pt return home Yes     External Supports     Immediate margarito   Phone Number   [phone]    Email      Marital Status/Children   , two children 19 and 18     Substance First use Last Use Frequency Amount Used How long Longest period of sobriety and when Method of use   THC            Heroin            Cocaine            ETOH   14 7/7/25 daily 24+ beers, or hard alcohol 10 days 3 months up until 10 days ago, 6 months in the past oral   Meth            Benzos            Other:             Denies any other substance use, pt reports that for the last few years his drinking was a problem, but prior to that had been able to drink without consequences, drinking on and off like \"normal\" people-explained that at times he was getting drunk more than his friends. Prior history of cocaine use and meth/amphetamine use over 20 years ago. 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 Discussed naltrexone at length, pt is interested, CM will confirm if his family practice would continue him on this medication. Pt declines rehab or counseling, attends 1 [place] AA meeting 1x monthly in person, and 2xAA on zoom, in Polish, each week.   "

## 2025-07-10 ENCOUNTER — APPOINTMENT (INPATIENT)
Dept: CT IMAGING | Facility: HOSPITAL | Age: 50
End: 2025-07-10
Payer: COMMERCIAL

## 2025-07-10 PROBLEM — R42 DIZZINESS: Status: ACTIVE | Noted: 2025-07-09

## 2025-07-10 LAB
ALBUMIN SERPL BCG-MCNC: 3.9 G/DL (ref 3.5–5)
ALP SERPL-CCNC: 71 U/L (ref 34–104)
ALT SERPL W P-5'-P-CCNC: 51 U/L (ref 7–52)
ANION GAP SERPL CALCULATED.3IONS-SCNC: 9 MMOL/L (ref 4–13)
AST SERPL W P-5'-P-CCNC: 37 U/L (ref 13–39)
BILIRUB SERPL-MCNC: 1.16 MG/DL (ref 0.2–1)
BUN SERPL-MCNC: 11 MG/DL (ref 5–25)
CALCIUM SERPL-MCNC: 8.8 MG/DL (ref 8.4–10.2)
CHLORIDE SERPL-SCNC: 106 MMOL/L (ref 96–108)
CO2 SERPL-SCNC: 24 MMOL/L (ref 21–32)
CREAT SERPL-MCNC: 0.69 MG/DL (ref 0.6–1.3)
ERYTHROCYTE [DISTWIDTH] IN BLOOD BY AUTOMATED COUNT: 12.2 % (ref 11.6–15.1)
GFR SERPL CREATININE-BSD FRML MDRD: 109 ML/MIN/1.73SQ M
GLUCOSE SERPL-MCNC: 83 MG/DL (ref 65–140)
HCT VFR BLD AUTO: 49.7 % (ref 36.5–49.3)
HGB BLD-MCNC: 17.3 G/DL (ref 12–17)
MAGNESIUM SERPL-MCNC: 2.3 MG/DL (ref 1.9–2.7)
MCH RBC QN AUTO: 31.3 PG (ref 26.8–34.3)
MCHC RBC AUTO-ENTMCNC: 34.8 G/DL (ref 31.4–37.4)
MCV RBC AUTO: 90 FL (ref 82–98)
PLATELET # BLD AUTO: 99 THOUSANDS/UL (ref 149–390)
PMV BLD AUTO: 10 FL (ref 8.9–12.7)
POTASSIUM SERPL-SCNC: 3.8 MMOL/L (ref 3.5–5.3)
PROT SERPL-MCNC: 6.1 G/DL (ref 6.4–8.4)
RBC # BLD AUTO: 5.52 MILLION/UL (ref 3.88–5.62)
SODIUM SERPL-SCNC: 139 MMOL/L (ref 135–147)
WBC # BLD AUTO: 7.87 THOUSAND/UL (ref 4.31–10.16)

## 2025-07-10 PROCEDURE — 99232 SBSQ HOSP IP/OBS MODERATE 35: CPT | Performed by: EMERGENCY MEDICINE

## 2025-07-10 PROCEDURE — 80053 COMPREHEN METABOLIC PANEL: CPT

## 2025-07-10 PROCEDURE — 99232 SBSQ HOSP IP/OBS MODERATE 35: CPT

## 2025-07-10 PROCEDURE — 85027 COMPLETE CBC AUTOMATED: CPT

## 2025-07-10 PROCEDURE — 70450 CT HEAD/BRAIN W/O DYE: CPT

## 2025-07-10 PROCEDURE — 83735 ASSAY OF MAGNESIUM: CPT

## 2025-07-10 RX ORDER — NALTREXONE HYDROCHLORIDE 50 MG/1
25 TABLET, FILM COATED ORAL DAILY
Status: DISCONTINUED | OUTPATIENT
Start: 2025-07-10 | End: 2025-07-11 | Stop reason: HOSPADM

## 2025-07-10 RX ORDER — LANOLIN ALCOHOL/MO/W.PET/CERES
100 CREAM (GRAM) TOPICAL DAILY
Status: DISCONTINUED | OUTPATIENT
Start: 2025-07-10 | End: 2025-07-10

## 2025-07-10 RX ORDER — LACTULOSE 10 G/15ML
20 SOLUTION ORAL ONCE
Status: COMPLETED | OUTPATIENT
Start: 2025-07-10 | End: 2025-07-10

## 2025-07-10 RX ADMIN — NALTREXONE HYDROCHLORIDE 25 MG: 50 TABLET, FILM COATED ORAL at 10:42

## 2025-07-10 RX ADMIN — THIAMINE HCL TAB 100 MG 100 MG: 100 TAB at 10:03

## 2025-07-10 RX ADMIN — THIAMINE HYDROCHLORIDE 500 MG: 100 INJECTION, SOLUTION INTRAMUSCULAR; INTRAVENOUS at 11:02

## 2025-07-10 RX ADMIN — NICOTINE POLACRILEX 2 MG: 2 GUM, CHEWING BUCCAL at 23:00

## 2025-07-10 RX ADMIN — THIAMINE HYDROCHLORIDE 500 MG: 100 INJECTION, SOLUTION INTRAMUSCULAR; INTRAVENOUS at 19:32

## 2025-07-10 RX ADMIN — AMLODIPINE BESYLATE 10 MG: 10 TABLET ORAL at 08:43

## 2025-07-10 RX ADMIN — MULTIPLE VITAMINS W/ MINERALS TAB 1 TABLET: TAB ORAL at 08:43

## 2025-07-10 RX ADMIN — ENOXAPARIN SODIUM 40 MG: 40 INJECTION SUBCUTANEOUS at 08:43

## 2025-07-10 RX ADMIN — TRAZODONE HYDROCHLORIDE 50 MG: 50 TABLET ORAL at 23:00

## 2025-07-10 RX ADMIN — ATENOLOL 25 MG: 25 TABLET ORAL at 08:43

## 2025-07-10 RX ADMIN — FOLIC ACID 1 MG: 1 TABLET ORAL at 08:43

## 2025-07-10 RX ADMIN — LACTULOSE 20 G: 20 SOLUTION ORAL at 13:02

## 2025-07-10 NOTE — ASSESSMENT & PLAN NOTE
Recent Labs     07/08/25  0333 07/09/25  0535 07/10/25  0502   AST 46* 29 37   ALT 69* 46 51   TBILI 0.76 1.40* 1.16*     Elevated as above  Physical exam shows no jaundice, scleral icertus, asterixis, encephalopathy, bruising  Avoid hepatotoxic medications  Imaging as needed

## 2025-07-10 NOTE — NURSING NOTE
"Pt placed on virtual 1:1 as per protocol for history of suicidal ideation. Pt states to this RN that he does NOT have any suicidal thoughts at this time. He states he remembers saying \"something\" in the ED, but it was \"misunderstood\". Pt denies thoughts of self harm, stating he wants to live and he  has a daughter that he knows needs him. Psych consulted.  "

## 2025-07-10 NOTE — PLAN OF CARE
Problem: PAIN - ADULT  Goal: Verbalizes/displays adequate comfort level or baseline comfort level  Description: Interventions:  - Encourage patient to monitor pain and request assistance  - Assess pain using appropriate pain scale  - Administer analgesics as ordered based on type and severity of pain and evaluate response  - Implement non-pharmacological measures as appropriate and evaluate response  - Consider cultural and social influences on pain and pain management  - Notify physician/advanced practitioner if interventions unsuccessful or patient reports new pain  - Educate patient/family on pain management process including their role and importance of  reporting pain   - Provide non-pharmacologic/complimentary pain relief interventions  Outcome: Progressing     Problem: INFECTION - ADULT  Goal: Absence or prevention of progression during hospitalization  Description: INTERVENTIONS:  - Assess and monitor for signs and symptoms of infection  - Monitor lab/diagnostic results  - Monitor all insertion sites, i.e. indwelling lines, tubes, and drains  - Monitor endotracheal if appropriate and nasal secretions for changes in amount and color  - [place] appropriate cooling/warming therapies per order  - Administer medications as ordered  - Instruct and encourage patient and family to use good hand hygiene technique  - Identify and instruct in appropriate isolation precautions for identified infection/condition  Outcome: Progressing     Problem: SAFETY ADULT  Goal: Patient will remain free of falls  Description: INTERVENTIONS:  - Educate patient/family on patient safety including physical limitations  - Instruct patient to call for assistance with activity   - Consider consulting OT/PT to assist with strengthening/mobility based on AM PAC & JH-HLM score  - Consult OT/PT to assist with strengthening/mobility   - Keep Call bell within reach  - Keep bed low and locked with side rails adjusted as appropriate  - Keep  care items and personal belongings within reach  - Initiate and maintain comfort rounds    - Apply yellow socks and bracelet for high fall risk patients  - Consider moving patient to room near nurses station  Outcome: Progressing  Goal: Maintain or return to baseline ADL function  Description: INTERVENTIONS:  -  Assess patient's ability to carry out ADLs; assess patient's baseline for ADL function and identify physical deficits which impact ability to perform ADLs (bathing, care of mouth/teeth, toileting, grooming, dressing, etc.)  - Assess/evaluate cause of self-care deficits   - Assess range of motion  - Assess patient's mobility; develop plan if impaired  - Assess patient's need for assistive devices and provide as appropriate  - Encourage maximum independence but intervene and supervise when necessary  - Involve family in performance of ADLs  - Assess for home care needs following discharge   - Consider OT consult to assist with ADL evaluation and planning for discharge  - Provide patient education as appropriate  - Monitor functional capacity and physical performance, use of AM PAC & JH-HLM   - Monitor gait, balance and fatigue with ambulation    Outcome: Progressing  Goal: Maintains/Returns to pre admission functional level  Description: INTERVENTIONS:  - Perform AM-PAC 6 Click Basic Mobility/ Daily Activity assessment daily.  - Set and communicate daily mobility goal to care team and patient/family/caregiver.   - Collaborate with rehabilitation services on mobility goals if consulted    - Ambulate patient 4 times a day    - Out of bed for toileting  - Record patient progress and toleration of activity level   Outcome: Progressing     Problem: DISCHARGE PLANNING  Goal: Discharge to home or other facility with appropriate resources  Description: INTERVENTIONS:  - Identify barriers to discharge w/patient and caregiver  - Arrange for needed discharge resources and transportation as appropriate  - Identify  discharge learning needs (meds, wound care, etc.)  - Arrange for interpretive services to assist at discharge as needed  - Refer to Case Management Department for coordinating discharge planning if the patient needs post-hospital services based on physician/advanced practitioner order or complex needs related to functional status, cognitive ability, or social support system  Outcome: Progressing     Problem: Knowledge Deficit  Goal: Patient/family/caregiver demonstrates understanding of disease process, treatment plan, medications, and discharge instructions  Description: Complete learning assessment and assess knowledge base.  Interventions:  - Provide teaching at level of understanding  - Provide teaching via preferred learning methods  Outcome: Progressing

## 2025-07-10 NOTE — PROGRESS NOTES
Progress Note - Hospitalist   Name: Frank Cm 50 y.o. male I MRN: [id number]  Unit/Bed#: 5T DETOX 514-01 I Date of Admission: 7/8/2025   Date of Service: 7/10/2025 I Hospital Day: 2    Assessment & Plan  Alcohol withdrawal syndrome with complication (HCC)  Patient last alcoholic drink was prior to coming to the ED at [facility].  Continue with telemetry and continuous pulse oximetry monitoring  Continue with vitals monitoring  Toxicology on board, admitted on SEWs protocol   Continue with IV fluids along with thiamine folic acid and multivitamin  Started on naltrexone 7/10 after D/C SEWS.  Consult to crisis  Consult case management/crisis [facility] [place] [facility]  Toxicology on board appreciate recs  Continue monitor symptoms  Alcohol use disorder, severe, dependence (HCC)  As above under alcohol withdrawal  Had been drinking large amounts of hard alcohol and large amounts of beer up to 24/days since last 10 days.  Dizziness  Related to eye movement, associated with memory disturbances per patient.   No focal neurologic deficit appreciated.   CT H 7/10 unremarkable for acute pathology.  Continue on IV thiamine. Improving with supplementation and resolution of withdrawal symptoms.  Check AM ammonia level. Trial 1x dose lactulose.  Suspect related to wernickes/alcohol abuse  Transaminitis  Recent Labs     07/08/25  0333 07/09/25  0535 07/10/25  0502   AST 46* 29 37   ALT 69* 46 51   ALKPHOS 85 72 71   TBILI 0.76 1.40* 1.16*     Likely secondary to alcohol use  Trend CMP  Normalizing today. T bili mildly increased.  Tobacco dependence  Smoking cessation and counseling done  Prefers nicotine gum ordered  Hypertension  Patient states he has not been compliant with his hypertensive medication  Initiated prior to admission amlodipine and atenolol.    VTE Pharmacologic Prophylaxis:   Moderate Risk (Score 3-4) - Pharmacological DVT Prophylaxis Ordered: enoxaparin (Lovenox).    Mobility:   Basic Mobility  Inpatient Raw Score: 24  JH-HLM Goal: 8: Walk 250 feet or more  JH-HLM Achieved: 8: Walk 250 feet ot more  JH-HLM Goal achieved. Continue to encourage appropriate mobility.    Patient Centered Rounds: I performed bedside rounds with nursing staff today.   Discussions with Specialists or Other Care Team Provider: CM, tox    Education and Discussions with Family / Patient: Patient IMM form stating only to update family member that he is in hospital. Did not discuss treatment plan or diagnosis etc per patient request.     Current Length of Stay: 2 day(s)  Current Patient Status: Inpatient   Certification Statement: The patient will continue to require additional inpatient hospital stay due to IV thiamine  Discharge Plan: Anticipate discharge in 24-48 hrs to TBD    Code Status: Level 1 - Full Code    Subjective   Patient states his vision difficulties are improving today.    Objective :  Temp:  [96.8 °F (36 °C)-97.8 °F (36.6 °C)] 97.5 °F (36.4 °C)  HR:  [] 71  BP: (112-137)/(71-95) 112/75  Resp:  [12-18] 16  SpO2:  [92 %-97 %] 95 %  O2 Device: None (Room air)    Body mass index is 34.46 kg/m².     Input and Output Summary (last 24 hours):     Intake/Output Summary (Last 24 hours) at 7/10/2025 0857  Last data filed at 7/10/2025 0841  Gross per 24 hour   Intake 780 ml   Output --   Net 780 ml       Physical Exam  Vitals reviewed.   Constitutional:       General: He is not in acute distress.     Appearance: Normal appearance. He is not toxic-appearing.   HENT:      Head: Normocephalic and atraumatic.     Eyes:      General: No scleral icterus.     Extraocular Movements: Extraocular movements intact.      Right eye: Normal extraocular motion and no nystagmus.      Left eye: Normal extraocular motion and no nystagmus.      Conjunctiva/sclera: Conjunctivae normal.      Pupils: Pupils are equal, round, and reactive to light.       Cardiovascular:      Rate and Rhythm: Normal rate and regular rhythm.   Pulmonary:       Effort: Pulmonary effort is normal. No respiratory distress.      Breath sounds: Normal breath sounds. No stridor.   Abdominal:      General: Abdomen is flat. Bowel sounds are normal. There is no distension.      Palpations: Abdomen is soft.     Musculoskeletal:         General: Normal range of motion.      Cervical back: Normal range of motion.     Skin:     Coloration: Skin is not jaundiced or pale.     Neurological:      General: No focal deficit present.      Mental Status: He is alert and oriented to person, place, and time. Mental status is at baseline.      Cranial Nerves: No dysarthria or facial asymmetry.      Motor: Tremor (mild) present. No weakness.             Lab Results: I have reviewed the following results:   Results from last 7 days   Lab Units 07/10/25  0502 07/09/25  0535 07/08/25  0333   WBC Thousand/uL 7.87   < > 10.50*   HEMOGLOBIN g/dL 17.3*   < > 19.2*   HEMATOCRIT % 49.7*   < > 53.2*   PLATELETS Thousands/uL 99*   < > 149   SEGS PCT %  --   --  44   LYMPHO PCT %  --   --  45*   MONO PCT %  --   --  8   EOS PCT %  --   --  2    < > = values in this interval not displayed.     Results from last 7 days   Lab Units 07/10/25  0502   SODIUM mmol/L 139   POTASSIUM mmol/L 3.8   CHLORIDE mmol/L 106   CO2 mmol/L 24   BUN mg/dL 11   CREATININE mg/dL 0.69   ANION GAP mmol/L 9   CALCIUM mg/dL 8.8   ALBUMIN g/dL 3.9   TOTAL BILIRUBIN mg/dL 1.16*   ALK PHOS U/L 71   ALT U/L 51   AST U/L 37   GLUCOSE RANDOM mg/dL 83     Results from last 7 days   Lab Units 07/08/25  0333   INR  0.88             Results from last 7 days   Lab Units 07/08/25  0502   LACTIC ACID mmol/L 2.0       Recent Cultures (last 7 days):         Imaging Results Review: No pertinent imaging studies reviewed.  Other Study Results Review: No additional pertinent studies reviewed.    Last 24 Hours Medication List:     Current Facility-Administered Medications:     amLODIPine (NORVASC) tablet 10 mg, Daily    atenolol (TENORMIN) tablet 25 mg,  Daily    enoxaparin (LOVENOX) subcutaneous injection 40 mg, Daily    folic acid (FOLVITE) tablet 1 mg, Daily    multivitamin-minerals (CENTRUM) tablet 1 tablet, Daily    nicotine polacrilex (NICORETTE) gum 2 mg, Q2H PRN    thiamine tablet 100 mg, Daily    traZODone (DESYREL) tablet 50 mg, HS PRN    Administrative Statements   Today, Patient Was Seen By: Ysabel Boston PA-C    **Please Note: This note may have been constructed using a voice recognition system.**

## 2025-07-10 NOTE — PLAN OF CARE
Problem: SAFETY ADULT  Goal: Patient will remain free of falls  Description: INTERVENTIONS:  - Educate patient/family on patient safety including physical limitations  - Instruct patient to call for assistance with activity   - Consider consulting OT/PT to assist with strengthening/mobility based on AM PAC & JH-HLM score  - Consult OT/PT to assist with strengthening/mobility   - Keep Call bell within reach  - Keep bed low and locked with side rails adjusted as appropriate  - Keep care items and personal belongings within reach  - Initiate and maintain comfort rounds  - Make Fall Risk Sign visible to staff  - Obtain necessary fall risk management equipment:   - Apply yellow socks and bracelet for high fall risk patients  - Consider moving patient to room near nurses station  Outcome: Progressing  Goal: Maintain or return to baseline ADL function  Description: INTERVENTIONS:  -  Assess patient's ability to carry out ADLs; assess patient's baseline for ADL function and identify physical deficits which impact ability to perform ADLs (bathing, care of mouth/teeth, toileting, grooming, dressing, etc.)  - Assess/evaluate cause of self-care deficits   - Assess range of motion  - Assess patient's mobility; develop plan if impaired  - Assess patient's need for assistive devices and provide as appropriate  - Encourage maximum independence but intervene and supervise when necessary  - Involve family in performance of ADLs  - Assess for home care needs following discharge   - Consider OT consult to assist with ADL evaluation and planning for discharge  - Provide patient education as appropriate  - Monitor functional capacity and physical performance, use of AM PAC & JH-HLM   - Monitor gait, balance and fatigue with ambulation    Outcome: Progressing  Goal: Maintains/Returns to pre admission functional level  Description: INTERVENTIONS:  - Perform AM-PAC 6 Click Basic Mobility/ Daily Activity assessment daily.  - Set and  communicate daily mobility goal to care team and patient/family/caregiver.   - Collaborate with rehabilitation services on mobility goals if consulted  - Out of bed for toileting  - Record patient progress and toleration of activity level   Outcome: Progressing

## 2025-07-10 NOTE — ASSESSMENT & PLAN NOTE
CM/CRS involved for aftercare planning and linkage to ongoing AUD treatment after discharge  Will discuss naltrexone for JULIO with the patient prior to discharge.   Will initiate on 25 mg Naltrexone (7/10/25)

## 2025-07-10 NOTE — CASE MANAGEMENT
CM missed return call from pt's PCP office regarding potential for naltrexone scrip, CM called again today. PCP office confirm they will continue pt on naltrexone or vivitrol.

## 2025-07-10 NOTE — ASSESSMENT & PLAN NOTE
The patient's withdrawal is complicated by a h/o sleep apnea warranting medically managed withdrawal.   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 He seems to be manifesting some of this  Current evidence of withdrawal includes tongue fasciculations and tachycardia  Discontinue SEWS protocol at this time  Continue supportive care, folate

## 2025-07-10 NOTE — PROGRESS NOTES
Progress Note - Medical Toxicology   Name: Frank Cm 50 y.o. male I MRN: [id number]  Unit/Bed#: 5T DETOX 514-01 I Date of Admission: 7/8/2025   Date of Service: 7/10/2025 I Hospital Day: 2    Assessment & Plan  Alcohol withdrawal syndrome with complication (HCC)  The patient's withdrawal is complicated by a h/o sleep apnea warranting medically managed withdrawal.   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 He seems to be manifesting some of this  Current evidence of withdrawal includes tongue fasciculations and tachycardia  Discontinue SEWS protocol at this time  Continue supportive care, folate  Tobacco dependence  NRT while in the hospital  4 minutes of cessation counseling provided  Alcohol use disorder, severe, dependence (HCC)  CM/CRS involved for aftercare planning and linkage to ongoing AUD treatment after discharge  Will discuss naltrexone for JULIO with the patient prior to discharge.   Will initiate on 25 mg Naltrexone (7/10/25)  Hypertension  Likely unrelated to withdrawal as patient has been noncompliant with antihypertensive meds for past few months.  Defer to primary team for management.   Transaminitis  Recent Labs     07/08/25  0333 07/09/25  0535 07/10/25  0502   AST 46* 29 37   ALT 69* 46 51   TBILI 0.76 1.40* 1.16*     Elevated as above  Physical exam shows no jaundice, scleral icertus, asterixis, encephalopathy, bruising  Avoid hepatotoxic medications  Imaging as needed   Blurred vision  No nystagmus appreciated on exam but considering patient has extensive AUD history and was drinking excessively without eating there is a concern for thiamine deficiency and or wernicke encephalopathy.   Initiate high dose thiamine 3 times daily x3 days.   Blurred vision improved on 7/10/25 but notes intermittent moments of confusion and slurred speech.   Defer to primary team for further medical workup  CT head ordered per primary  team    Reason for current consult: alcohol withdrawal syndrome     Subjective   Patient evaluated at bedside. He reports he is feeling well today and denies symptoms of withdrawal. Patient notes last night he was talking to his wife and was slurring his speech, was quite forgetful, and this made him significantly anxious. He reports improvement in visual symptoms and denies blurred vision at this time. He reports the blurred vision and forgetfulness comes in waves. Extensive counseling and education provided at bedside regarding aud, naltrexone, and outpatient resources. Patient may be open to trying naltrexone but notes having a poor experience with medication prescribed by his pcp for tobacco cessation and is worried it was Naltrexone but does not remember the name.     Objective :  Temp:  [96.8 °F (36 °C)-97.8 °F (36.6 °C)] 97.5 °F (36.4 °C)  HR:  [] 71  BP: (112-137)/(71-95) 112/75  Resp:  [12-18] 16  SpO2:  [92 %-97 %] 95 %  O2 Device: None (Room air)      Intake/Output Summary (Last 24 hours) at 7/10/2025 1023  Last data filed at 7/10/2025 0841  Gross per 24 hour   Intake 780 ml   Output --   Net 780 ml       Physical Exam  Vitals and nursing note reviewed.   Constitutional:       General: He is not in acute distress.     Appearance: He is well-developed.   HENT:      Head: Normocephalic and atraumatic.     Eyes:      Conjunctiva/sclera: Conjunctivae normal.       Cardiovascular:      Rate and Rhythm: Normal rate and regular rhythm.      Heart sounds: No murmur heard.  Pulmonary:      Effort: Pulmonary effort is normal. No respiratory distress.   Abdominal:      General: There is no distension.      Palpations: Abdomen is soft.      Tenderness: There is no abdominal tenderness.     Musculoskeletal:         General: No swelling.      Cervical back: Neck supple.     Skin:     General: Skin is warm and dry.      Capillary Refill: Capillary refill takes less than 2 seconds.     Neurological:      Mental  Status: He is alert.     Psychiatric:         Mood and Affect: Mood normal.           Lab Results: I have reviewed the following results:CBC/BMP:   .     07/10/25  0502   WBC 7.87   HGB 17.3*   HCT 49.7*   PLT 99*   SODIUM 139   K 3.8      CO2 24   BUN 11   CREATININE 0.69   GLUC 83   MG 2.3    , LFTs:   .     07/10/25  0502   AST 37   ALT 51   ALB 3.9   TBILI 1.16*   ALKPHOS 71        Imaging Results Review: I reviewed radiology reports from this admission including: CT abdomen/pelvis.  Other Study Results Review: EKG was reviewed.     Administrative Statements   I have spent a total time of 25 minutes in caring for this patient on the day of the visit/encounter including Diagnostic results, Prognosis, Risks and benefits of tx options, Instructions for management, Patient and family education, Importance of tx compliance, Risk factor reductions, Impressions, Counseling / Coordination of care, Documenting in the medical record, Reviewing/placing orders in the medical record (including tests, medications, and/or procedures), Obtaining or reviewing history  , and Communicating with other healthcare professionals .

## 2025-07-10 NOTE — ASSESSMENT & PLAN NOTE
Patient last alcoholic drink was prior to coming to the ED at [facility].  Continue with telemetry and continuous pulse oximetry monitoring  Continue with vitals monitoring  Toxicology on board, admitted on SEWs protocol   Continue with IV fluids along with thiamine folic acid and multivitamin  Started on naltrexone 7/10 after D/C SEWS.  Consult to crisis  Consult case management/crisis for assistant with [facility] resources  Toxicology on board appreciate recs  Continue monitor symptoms

## 2025-07-10 NOTE — ASSESSMENT & PLAN NOTE
Recent Labs     07/08/25  0333 07/09/25  0535 07/10/25  0502   AST 46* 29 37   ALT 69* 46 51   ALKPHOS 85 72 71   TBILI 0.76 1.40* 1.16*     Likely secondary to alcohol use  Trend CMP  Normalizing today. T bili mildly increased.

## 2025-07-10 NOTE — NURSING NOTE
"Pt stated that he was feeling \"slow in the head\" having difficulty turning his head quickly from side to side and feels like he's off balance when he's walking. PA aware, ct ordered which was negative for acute process. Pt reminded to ask for assistance with ambulation.  "

## 2025-07-10 NOTE — ASSESSMENT & PLAN NOTE
No nystagmus appreciated on exam but considering patient has extensive AUD history and was drinking excessively without eating there is a concern for thiamine deficiency and or wernicke encephalopathy.   Initiate high dose thiamine 3 times daily x3 days.   Blurred vision improved on 7/10/25 but notes intermittent moments of confusion and slurred speech.   Defer to primary team for further medical workup  CT head ordered per primary team

## 2025-07-10 NOTE — ASSESSMENT & PLAN NOTE
Patient states he has not been compliant with his hypertensive medication  Initiated prior to admission amlodipine and atenolol.

## 2025-07-10 NOTE — ASSESSMENT & PLAN NOTE
Related to eye movement, associated with memory disturbances per patient.   No focal neurologic deficit appreciated.   CT H 7/10 unremarkable for acute pathology.  Continue on IV thiamine. Improving with supplementation and resolution of withdrawal symptoms.  Check AM ammonia level. Trial 1x dose lactulose.  Suspect related to wernickes/alcohol abuse

## 2025-07-11 ENCOUNTER — TRANSITIONAL CARE MANAGEMENT (OUTPATIENT)
Dept: FAMILY MEDICINE CLINIC | Facility: CLINIC | Age: 50
End: 2025-07-11

## 2025-07-11 ENCOUNTER — TELEPHONE (OUTPATIENT)
Dept: FAMILY MEDICINE CLINIC | Facility: CLINIC | Age: 50
End: 2025-07-11

## 2025-07-11 VITALS
HEIGHT: 67 IN | OXYGEN SATURATION: 95 % | SYSTOLIC BLOOD PRESSURE: 113 MMHG | TEMPERATURE: 97.7 F | HEART RATE: 76 BPM | RESPIRATION RATE: 18 BRPM | DIASTOLIC BLOOD PRESSURE: 75 MMHG | WEIGHT: 220 LBS | BODY MASS INDEX: 34.53 KG/M2

## 2025-07-11 LAB
AMMONIA PLAS-SCNC: 42 UMOL/L (ref 18–72)
ANION GAP SERPL CALCULATED.3IONS-SCNC: 7 MMOL/L (ref 4–13)
BUN SERPL-MCNC: 12 MG/DL (ref 5–25)
CALCIUM SERPL-MCNC: 8.7 MG/DL (ref 8.4–10.2)
CHLORIDE SERPL-SCNC: 106 MMOL/L (ref 96–108)
CO2 SERPL-SCNC: 24 MMOL/L (ref 21–32)
CREAT SERPL-MCNC: 0.75 MG/DL (ref 0.6–1.3)
ERYTHROCYTE [DISTWIDTH] IN BLOOD BY AUTOMATED COUNT: 12.2 % (ref 11.6–15.1)
GFR SERPL CREATININE-BSD FRML MDRD: 106 ML/MIN/1.73SQ M
GLUCOSE SERPL-MCNC: 77 MG/DL (ref 65–140)
HCT VFR BLD AUTO: 51.2 % (ref 36.5–49.3)
HGB BLD-MCNC: 17.1 G/DL (ref 12–17)
MAGNESIUM SERPL-MCNC: 2.3 MG/DL (ref 1.9–2.7)
MCH RBC QN AUTO: 30.5 PG (ref 26.8–34.3)
MCHC RBC AUTO-ENTMCNC: 33.4 G/DL (ref 31.4–37.4)
MCV RBC AUTO: 91 FL (ref 82–98)
PLATELET # BLD AUTO: 97 THOUSANDS/UL (ref 149–390)
PMV BLD AUTO: 9.9 FL (ref 8.9–12.7)
POTASSIUM SERPL-SCNC: 4.2 MMOL/L (ref 3.5–5.3)
RBC # BLD AUTO: 5.61 MILLION/UL (ref 3.88–5.62)
SODIUM SERPL-SCNC: 137 MMOL/L (ref 135–147)
WBC # BLD AUTO: 8.98 THOUSAND/UL (ref 4.31–10.16)

## 2025-07-11 PROCEDURE — 85027 COMPLETE CBC AUTOMATED: CPT

## 2025-07-11 PROCEDURE — 80048 BASIC METABOLIC PNL TOTAL CA: CPT

## 2025-07-11 PROCEDURE — 99239 HOSP IP/OBS DSCHRG MGMT >30: CPT

## 2025-07-11 PROCEDURE — 82140 ASSAY OF AMMONIA: CPT

## 2025-07-11 PROCEDURE — 83735 ASSAY OF MAGNESIUM: CPT

## 2025-07-11 RX ORDER — AMLODIPINE BESYLATE 10 MG/1
10 TABLET ORAL DAILY
Qty: 30 TABLET | Refills: 0 | Status: SHIPPED | OUTPATIENT
Start: 2025-07-12 | End: 2025-08-11

## 2025-07-11 RX ORDER — GAUZE BANDAGE 2" X 2"
100 BANDAGE TOPICAL DAILY
Qty: 30 TABLET | Refills: 0 | Status: SHIPPED | OUTPATIENT
Start: 2025-07-11 | End: 2025-08-10

## 2025-07-11 RX ORDER — ATENOLOL 25 MG/1
25 TABLET ORAL DAILY
Qty: 30 TABLET | Refills: 0 | Status: SHIPPED | OUTPATIENT
Start: 2025-07-12 | End: 2025-08-11

## 2025-07-11 RX ORDER — NALTREXONE HYDROCHLORIDE 50 MG/1
50 TABLET, FILM COATED ORAL DAILY
Qty: 30 TABLET | Refills: 0 | Status: SHIPPED | OUTPATIENT
Start: 2025-07-11 | End: 2025-07-14 | Stop reason: SDUPTHER

## 2025-07-11 RX ADMIN — THIAMINE HYDROCHLORIDE 500 MG: 100 INJECTION, SOLUTION INTRAMUSCULAR; INTRAVENOUS at 02:34

## 2025-07-11 RX ADMIN — MULTIPLE VITAMINS W/ MINERALS TAB 1 TABLET: TAB ORAL at 08:35

## 2025-07-11 RX ADMIN — FOLIC ACID 1 MG: 1 TABLET ORAL at 08:35

## 2025-07-11 RX ADMIN — AMLODIPINE BESYLATE 10 MG: 10 TABLET ORAL at 08:35

## 2025-07-11 RX ADMIN — ATENOLOL 25 MG: 25 TABLET ORAL at 08:35

## 2025-07-11 NOTE — ASSESSMENT & PLAN NOTE
Patient states he has not been compliant with his hypertensive medication  Initiated prior to admission amlodipine and atenolol.  Follow up with family doctor upon discharge

## 2025-07-11 NOTE — PLAN OF CARE
Problem: SAFETY ADULT  Goal: Patient will remain free of falls  Description: INTERVENTIONS:  - Educate patient/family on patient safety including physical limitations  - Instruct patient to call for assistance with activity   - Consider consulting OT/PT to assist with strengthening/mobility based on AM PAC & JH-HLM score  - Consult OT/PT to assist with strengthening/mobility   - Keep Call bell within reach  - Keep bed low and locked with side rails adjusted as appropriate  - Keep care items and personal belongings within reach  - Initiate and maintain comfort rounds    - Apply yellow socks and bracelet for high fall risk patients  - Consider moving patient to room near nurses station  Outcome: Progressing  Goal: Maintain or return to baseline ADL function  Description: INTERVENTIONS:  -  Assess patient's ability to carry out ADLs; assess patient's baseline for ADL function and identify physical deficits which impact ability to perform ADLs (bathing, care of mouth/teeth, toileting, grooming, dressing, etc.)  - Assess/evaluate cause of self-care deficits   - Assess range of motion  - Assess patient's mobility; develop plan if impaired  - Assess patient's need for assistive devices and provide as appropriate  - Encourage maximum independence but intervene and supervise when necessary  - Involve family in performance of ADLs  - Assess for home care needs following discharge   - Consider OT consult to assist with ADL evaluation and planning for discharge  - Provide patient education as appropriate  - Monitor functional capacity and physical performance, use of AM PAC & JH-HLM   - Monitor gait, balance and fatigue with ambulation    Outcome: Progressing  Goal: Maintains/Returns to pre admission functional level  Description: INTERVENTIONS:  - Perform AM-PAC 6 Click Basic Mobility/ Daily Activity assessment daily.  - Set and communicate daily mobility goal to care team and patient/family/caregiver.   - Collaborate with  rehabilitation services on mobility goals if consulted    - Ambulate patient 4 times a day    - Out of bed for toileting  - Record patient progress and toleration of activity level   Outcome: Progressing     Problem: INFECTION - ADULT  Goal: Absence or prevention of progression during hospitalization  Description: INTERVENTIONS:  - Assess and monitor for signs and symptoms of infection  - Monitor lab/diagnostic results  - Monitor all insertion sites, i.e. indwelling lines, tubes, and drains  - Monitor endotracheal if appropriate and nasal secretions for changes in amount and color  - Whitney appropriate cooling/warming therapies per order  - Administer medications as ordered  - Instruct and encourage patient and family to use good hand hygiene technique  - Identify and instruct in appropriate isolation precautions for identified infection/condition  Outcome: Progressing     Problem: PAIN - ADULT  Goal: Verbalizes/displays adequate comfort level or baseline comfort level  Description: Interventions:  - Encourage patient to monitor pain and request assistance  - Assess pain using appropriate pain scale  - Administer analgesics as ordered based on type and severity of pain and evaluate response  - Implement non-pharmacological measures as appropriate and evaluate response  - Consider cultural and social influences on pain and pain management  - Notify physician/advanced practitioner if interventions unsuccessful or patient reports new pain  - Educate patient/family on pain management process including their role and importance of  reporting pain   - Provide non-pharmacologic/complimentary pain relief interventions  Outcome: Progressing     Problem: DISCHARGE PLANNING  Goal: Discharge to home or other facility with appropriate resources  Description: INTERVENTIONS:  - Identify barriers to discharge w/patient and caregiver  - Arrange for needed discharge resources and transportation as appropriate  - Identify discharge  learning needs (meds, wound care, etc.)  - Arrange for interpretive services to assist at discharge as needed  - Refer to Case Management Department for coordinating discharge planning if the patient needs post-hospital services based on physician/advanced practitioner order or complex needs related to functional status, cognitive ability, or social support system  Outcome: Progressing     Problem: Knowledge Deficit  Goal: Patient/family/caregiver demonstrates understanding of disease process, treatment plan, medications, and discharge instructions  Description: Complete learning assessment and assess knowledge base.  Interventions:  - Provide teaching at level of understanding  - Provide teaching via preferred learning methods  Outcome: Progressing

## 2025-07-11 NOTE — TELEPHONE ENCOUNTER
----- Message from Theresa ALMODOVAR sent at 7/11/2025  1:59 PM EDT -----  Please assist with scheduling  ----- Message -----  From: Itz Dawson MD  Sent: 7/11/2025   1:25 PM EDT  To: [facility] [address]  Clinical    Tcm? Was in the alcohol/substance detox unit but should have follow up regardless if not a tcm  ----- Message -----  From: Fabiola Patricio PA-C  Sent: 7/11/2025  10:24 AM EDT  To: Itz Dawson MD    Thank you for allowing us to participate in the care of your patient, Frank Cm, who was hospitalized from 7/8/2025 through 7/11/2025 with the admitting diagnosis of alcohol withdrawal.      Medication Changes:  Started on Naltrexone 50 mg daily  Continue daily thiamine supplementation   Restarted prior blood pressure medication     If you have any additional questions or would like to discuss further, please feel free to contact me.    Fabiola Patricio PA-C  [facility], Hospitalist  [phone]

## 2025-07-11 NOTE — ASSESSMENT & PLAN NOTE
As above under alcohol withdrawal  Had been drinking large amounts of hard alcohol and large amounts of beer up to 24/days since last 10 days.  Started on Naltrexone 25 mg daily- will discharge with Naltrexone 50 mg daily   CRS and CM consulted for after care planning

## 2025-07-11 NOTE — ASSESSMENT & PLAN NOTE
Recent Labs     07/09/25  0535 07/10/25  0502   AST 29 37   ALT 46 51   ALKPHOS 72 71   TBILI 1.40* 1.16*     Likely secondary to alcohol use  Recommend repeat labs in 2 months   Encouraged alcohol cessation

## 2025-07-11 NOTE — DISCHARGE SUMMARY
Discharge Summary - Hospitalist   Name: Frank Cm 50 y.o. male I MRN: [id number]  Unit/Bed#: 5T DETOX 514-01 I Date of Admission: 7/8/2025   Date of Service: 7/11/2025 I Hospital Day: 3     Assessment & Plan  Alcohol withdrawal syndrome with complication (HCC)  Patient last alcoholic drink was prior to coming to the ED at [place].  Continue with telemetry and continuous pulse oximetry monitoring  Continue with vitals monitoring  Toxicology consulted; signed off on 7/10   Total phenobarbital: 845 mg   No further objective findings of withdrawal   Alcohol use disorder, severe, dependence (HCC)  As above under alcohol withdrawal  Had been drinking large amounts of hard alcohol and large amounts of beer up to 24/days since last 10 days.  Started on Naltrexone 25 mg daily- will discharge with Naltrexone 50 mg daily   CRS and CM consulted for after care planning   Dizziness  Related to eye movement, associated with memory disturbances per patient. Likely side effect of phenobarbital.   Also endorsing recent sinus infection, referral to ENT if symptoms continue to persist is recommended   No focal neurologic deficit appreciated.   CT H 7/10 unremarkable for acute pathology.  Improved this AM- likely component of withdrawal. He received a course of high dose thiamine- coordination is intake, alert and oriented x4   Transaminitis  Recent Labs     07/09/25  0535 07/10/25  0502   AST 29 37   ALT 46 51   ALKPHOS 72 71   TBILI 1.40* 1.16*     Likely secondary to alcohol use  Recommend repeat labs in 2 months   Encouraged alcohol cessation   Tobacco dependence  Smoking cessation and counseling done  Prefers nicotine gum ordered  Hypertension  Patient states he has not been compliant with his hypertensive medication  Initiated prior to admission amlodipine and atenolol.  Follow up with family doctor upon discharge      Medical Problems       Resolved Problems  Date Reviewed: 10/11/2024   None       Discharging Physician /  Practitioner: Fabiola Patricio PA-C  PCP: tIz Dawson MD  Admission Date:   Admission Orders (From admission, onward)       Ordered        07/08/25 1308  Inpatient Admission  Once                          Discharge Date: 07/11/25    Next Steps for Physician/AP Assuming Care:  Establish with PCP upon discharge as blood pressure medication was restarted. Patient was also started on Naltrexone 50 mg for alcohol use disorder     Test Results Pending at Discharge (will require follow up):  None     Medication Changes for Discharge & Rationale:   Continue amlodipine and metoprolol  Continue Naltrexone 50 mg   Continue daily thiamine supplementation   See after visit summary for reconciled discharge medications provided to patient and/or family.     Consultations During Hospital Stay:  Toxicology     Procedures Performed:   None     Significant Findings / Test Results:   CT head: No acute intracranial abnormality.     Incidental Findings:   None      Hospital Course:   Frank Cm is a 50 y.o. male patient who originally presented to the hospital on 7/8/2025 due to alcohol withdrawal seeking detoxification. Patient initially presented to the [facility] ED requesting alcohol detox with sx of sweating, tremors and alcoholic hallucinosis.  Patient was subsequently transferred to the [facility] withdrawal management unit. He was started on SEWS protocol and received a total of 845 mg. Patient did report that he was also experiencing dizziness, this was felt to be a side effect of phenobarbital. However as patient was endorsing withdrawal complicated by visual hallucinations he was started on a high dose thiamine regimen. CT imaging of the head was also obtained which resulted with no intracranial abnormality. Toxicology had discontinued phenobarbital on 7/10. This morning patient is alert and oriented; he has no acute complaints tolerated naltrexone. Case Management and CRS consulted for after care planning- patient  "will be discharged with Naltrexone 50 mg.     Please see above list of diagnoses and related plan for additional information.     Discharge Day Visit / Exam:   Subjective:  Patient seen and examined at bedside, he is alert and oriented x3. Denies further withdrawal symptoms, he is eager for discharge today.     Vitals: Blood Pressure: 113/75 (07/11/25 0725)  Pulse: 76 (07/11/25 0725)  Temperature: 97.7 °F (36.5 °C) (07/11/25 0725)  Temp Source: Temporal (07/11/25 0725)  Respirations: 18 (07/11/25 0725)  Height: 5' 7\" (170.2 cm) (07/08/25 1245)  Weight - Scale: 99.8 kg (220 lb) (07/08/25 1245)  SpO2: 95 % (07/11/25 0725)  Physical Exam  Vitals reviewed.   Constitutional:       General: He is not in acute distress.     Appearance: He is not diaphoretic.     Eyes:      General: No scleral icterus.     Pupils: Pupils are equal, round, and reactive to light.       Cardiovascular:      Rate and Rhythm: Normal rate and regular rhythm.      Heart sounds: No murmur heard.  Pulmonary:      Effort: No respiratory distress.      Breath sounds: Normal breath sounds.   Abdominal:      General: There is no distension.      Palpations: Abdomen is soft.      Tenderness: There is no abdominal tenderness.     Neurological:      Mental Status: He is alert and oriented to person, place, and time.      Motor: No tremor.      Coordination: Coordination is intact.      Gait: Gait is intact.     Psychiatric:         Mood and Affect: Mood is not anxious.          Discussion with Family: Patient declined call to .     Discharge instructions/Information to patient and family:   See after visit summary for information provided to patient and family.      Provisions for Follow-Up Care:  See after visit summary for information related to follow-up care and any pertinent home health orders.      Mobility at time of Discharge:   Basic Mobility Inpatient Raw Score: 24  JH-HLM Goal: 8: Walk 250 feet or more  JH-HLM Achieved: 7: Walk 25 " feet or more  HLM Goal achieved. Continue to encourage appropriate mobility.     Disposition:   Home    Planned Readmission: none     Administrative Statements   Discharge Statement:  I have spent a total time of 35 minutes in caring for this patient on the day of the visit/encounter. .    **Please Note: This note may have been constructed using a voice recognition system**

## 2025-07-11 NOTE — ASSESSMENT & PLAN NOTE
Related to eye movement, associated with memory disturbances per patient. Likely side effect of phenobarbital.   Also endorsing recent sinus infection, referral to ENT if symptoms continue to persist is recommended   No focal neurologic deficit appreciated.   CT H 7/10 unremarkable for acute pathology.  Improved this AM- likely component of withdrawal. He received a course of high dose thiamine- coordination is intake, alert and oriented x4

## 2025-07-11 NOTE — ASSESSMENT & PLAN NOTE
Patient last alcoholic drink was prior to coming to the ED at [place].  Continue with telemetry and continuous pulse oximetry monitoring  Continue with vitals monitoring  Toxicology consulted; signed off on 7/10   Total phenobarbital: 845 mg   No further objective findings of withdrawal

## 2025-07-11 NOTE — CERTIFIED RECOVERY SPECIALIST
Certified  Note      Name: Frank Cm 50 y.o. male I MRN: [id number]  Unit/Bed#: 5T DETOX 514-01 I Date of Admission: 7/8/2025   Date of Service: 7/11/2025 I Hospital Day: 3    Summary of Contact    CRS engaged with patient to check in and offer support if desired.  CRS made introduction and  explained CRS services provided at hospital(Support, encouragement and resources if desired)     Patient reports he is doing well and nothing needed at this time. Patient reports wanting to still be able to drink eventually but plans on attending AA and remaining abstinent currently.     CRS educated patient about the disease of Alcohol/Substance Use Disorder, and its progression as well as the physical and medical results of long term use.      Resources and contact information given     Follow up: NA     Administrative Statements  I have spent a total time of 15 minutes in caring for this patient on the day of the visit/encounter.    Nessa Schwartz

## 2025-07-14 ENCOUNTER — OFFICE VISIT (OUTPATIENT)
Dept: FAMILY MEDICINE CLINIC | Facility: CLINIC | Age: 50
End: 2025-07-14
Payer: COMMERCIAL

## 2025-07-14 VITALS
HEIGHT: 67 IN | BODY MASS INDEX: 34.06 KG/M2 | SYSTOLIC BLOOD PRESSURE: 126 MMHG | WEIGHT: 217 LBS | HEART RATE: 69 BPM | OXYGEN SATURATION: 96 % | DIASTOLIC BLOOD PRESSURE: 84 MMHG | TEMPERATURE: 98.2 F

## 2025-07-14 DIAGNOSIS — H60.391 OTHER INFECTIVE ACUTE OTITIS EXTERNA OF RIGHT EAR: ICD-10-CM

## 2025-07-14 DIAGNOSIS — I10 PRIMARY HYPERTENSION: ICD-10-CM

## 2025-07-14 DIAGNOSIS — F10.939 ALCOHOL WITHDRAWAL SYNDROME WITH COMPLICATION (HCC): ICD-10-CM

## 2025-07-14 DIAGNOSIS — R74.01 TRANSAMINITIS: ICD-10-CM

## 2025-07-14 DIAGNOSIS — R74.8 ELEVATED LIVER ENZYMES: ICD-10-CM

## 2025-07-14 DIAGNOSIS — F10.20 ALCOHOL USE DISORDER, SEVERE, DEPENDENCE (HCC): ICD-10-CM

## 2025-07-14 DIAGNOSIS — H92.09 EAR ACHE: ICD-10-CM

## 2025-07-14 DIAGNOSIS — R42 DIZZINESS: Primary | ICD-10-CM

## 2025-07-14 DIAGNOSIS — J01.10 ACUTE NON-RECURRENT FRONTAL SINUSITIS: ICD-10-CM

## 2025-07-14 PROCEDURE — 99496 TRANSJ CARE MGMT HIGH F2F 7D: CPT | Performed by: STUDENT IN AN ORGANIZED HEALTH CARE EDUCATION/TRAINING PROGRAM

## 2025-07-14 RX ORDER — CEFUROXIME AXETIL 250 MG/1
250 TABLET ORAL EVERY 12 HOURS SCHEDULED
Qty: 6 TABLET | Refills: 0 | Status: SHIPPED | OUTPATIENT
Start: 2025-07-14 | End: 2025-07-17

## 2025-07-14 RX ORDER — CIPROFLOXACIN AND DEXAMETHASONE 3; 1 MG/ML; MG/ML
4 SUSPENSION/ DROPS AURICULAR (OTIC) 2 TIMES DAILY
Qty: 7.5 ML | Refills: 0 | Status: SHIPPED | OUTPATIENT
Start: 2025-07-14 | End: 2025-07-19

## 2025-07-14 RX ORDER — NALTREXONE HYDROCHLORIDE 50 MG/1
50 TABLET, FILM COATED ORAL DAILY
Qty: 90 TABLET | Refills: 0 | Status: SHIPPED | OUTPATIENT
Start: 2025-07-14 | End: 2025-07-21

## 2025-07-14 NOTE — UTILIZATION REVIEW
NOTIFICATION OF ADMISSION DISCHARGE   This is a Notification of Discharge from [facility]. Please be advised that this patient has been discharge from our facility. Below you will find the admission and discharge date and time including the patient’s disposition.   UTILIZATION REVIEW CONTACT:  Utilization Review Assistants  Network Utilization Review Department  Phone: [phone] x carefully listen to the prompts. All voicemails are confidential.  Email: [email or web address]     ADMISSION INFORMATION  PRESENTATION DATE: 7/8/2025 12:36 PM  OBERVATION ADMISSION DATE: N/A  INPATIENT ADMISSION DATE: 7/8/25 12:36 PM   DISCHARGE DATE: 7/11/2025  2:40 PM   DISPOSITION:Home/Self Care    Network Utilization Review Department  ATTENTION: Please call with any questions or concerns to [phone] and carefully listen to the prompts so that you are directed to the right person. All voicemails are confidential.   For Discharge needs, contact Care Management DC Support Team at [phone] opt. 2  Send all requests for admission clinical reviews, approved or denied determinations and any other requests to dedicated fax number below belonging to the campus where the patient is receiving treatment. List of dedicated fax numbers for the Facilities:  FACILITY NAME UR FAX NUMBER   ADMISSION DENIALS (Administrative/Medical Necessity) [phone]   DISCHARGE SUPPORT TEAM ([facility]) [phone]   PARENT CHILD HEALTH (Maternity/NICU/Pediatrics) [phone]   [facility] [phone]   [facility] [phone]   Critical access hospital [phone]   [facility] [phone]   [facility] [phone]   [facility] [phone]   [facility] [phone]   [facility] [phone]   [facility]  [facility] [phone]   [facility] [phone]   [facility] [phone]   [facility] [phone]

## 2025-07-14 NOTE — PROGRESS NOTES
Transition of Care Visit:  Name: Frank Cm      : 1975      MRN: [id number]  Encounter Provider: Itz Dawson MD  Encounter Date: 2025   Encounter department: [facility] [address]    Assessment & Plan  Dizziness  Broad differential, possible from the sinus infection he is dealing with versus otitis media       Alcohol withdrawal syndrome with complication (HCC)  Had a lengthy discussion in regards to alcohol use disorder.  Recommend naltrexone once daily for at least 4 to 6 months.  Patient is overall agreeable to this but was to similar research on his and  Orders:  •  naltrexone (REVIA) 50 mg tablet; Take 1 tablet (50 mg total) by mouth daily for 7 days    Alcohol use disorder, severe, dependence (HCC)    Orders:  •  naltrexone (REVIA) 50 mg tablet; Take 1 tablet (50 mg total) by mouth daily for 7 days    Ear ache    Orders:  •  ciprofloxacin-dexamethasone (CIPRODEX) otic suspension; Administer 4 drops to the right ear 2 (two) times a day for 5 days    Other infective acute otitis externa of right ear    Orders:  •  ciprofloxacin-dexamethasone (CIPRODEX) otic suspension; Administer 4 drops to the right ear 2 (two) times a day for 5 days    Acute non-recurrent frontal sinusitis    Orders:  •  cefuroxime (CEFTIN) 250 mg tablet; Take 1 tablet (250 mg total) by mouth every 12 (twelve) hours for 3 days    Elevated liver enzymes  Recheck in 2 to 3 months  Orders:  •  Comprehensive metabolic panel; Future    Primary hypertension  Stable today       Transaminitis              History of Present Illness     Transitional Care Management Review:   Frank Cm is a 50 y.o. male here for TCM follow up.     During the TCM phone call patient stated:  TCM Call (since 2025)     Date and time call was made  2025  2:57 PM    Hospital care reviewed  Records reviewed    Patient was hospitialized at  Other (comment)    Date of Admission  25    Date  of discharge  07/11/25    Diagnosis  alcohol withdrawl    Disposition  Home    Were the patients medications reviewed and updated  Yes    Current Symptoms  None      TCM Call (since 6/30/2025)     Post hospital issues  None    Scheduled for follow up?  Yes    Did you obtain your prescribed medications  Yes    I have advised the patient to call PCP with any new or worsening symptoms  Theresa Ngo    Living Arrangements  Family members    Support System  Family    The type of support provided  Emotional    Do you have social support  Yes, as much as I need    Are you recieving home care services  No        HPI    Frank Cm is a 50 y.o. male patient who originally presented to the hospital on 7/8/2025 due to alcohol withdrawal seeking detoxification. Patient initially presented to the [facility] ED requesting alcohol detox with sx of sweating, tremors and alcoholic hallucinosis.  Patient was subsequently transferred to the [facility] withdrawal management unit. He was started on SEWS protocol and received a total of 845 mg. Patient did report that he was also experiencing dizziness, this was felt to be a side effect of phenobarbital. However as patient was endorsing withdrawal complicated by visual hallucinations he was started on a high dose thiamine regimen. CT imaging of the head was also obtained which resulted with no intracranial abnormality. Toxicology had discontinued phenobarbital on 7/10. This morning patient is alert and oriented; he has no acute complaints tolerated naltrexone. Case Management and CRS consulted for after care planning- patient will be discharged with Naltrexone 50 mg.     Review of Systems   Constitutional:  Negative for chills, fatigue and fever.   HENT:  Positive for ear discharge (blood), ear pain and sinus pain. Negative for rhinorrhea and sore throat.    Eyes:  Negative for visual disturbance.   Respiratory:  Negative for cough and shortness of breath.    Cardiovascular:  Negative  "for chest pain and palpitations.   Gastrointestinal:  Negative for abdominal pain, constipation, diarrhea, nausea and vomiting.   Genitourinary:  Negative for difficulty urinating, dysuria and frequency.   Musculoskeletal:  Negative for arthralgias and myalgias.   Skin:  Negative for color change and rash.   Neurological:  Negative for weakness and headaches.     Objective   /84 (BP Location: Left arm, Patient Position: Sitting, Cuff Size: Large)   Pulse 69   Temp 98.2 °F (36.8 °C) (Temporal)   Ht 5' 7\" (1.702 m)   Wt 98.4 kg (217 lb)   SpO2 96%   BMI 33.99 kg/m²     Physical Exam  Constitutional:       General: He is not in acute distress.     Appearance: Normal appearance. He is not ill-appearing.   HENT:      Head: Normocephalic and atraumatic.      Right Ear: Tympanic membrane, ear canal and external ear normal. Drainage (blood) and swelling present. No middle ear effusion.      Left Ear: Tympanic membrane, ear canal and external ear normal.      Ears:      Comments: TM appears to be intact on the right side     Nose: Nose normal.      Mouth/Throat:      Mouth: Mucous membranes are moist.      Pharynx: Oropharynx is clear. No oropharyngeal exudate or posterior oropharyngeal erythema.     Eyes:      General: No scleral icterus.        Right eye: No discharge.         Left eye: No discharge.      Extraocular Movements: Extraocular movements intact.      Conjunctiva/sclera: Conjunctivae normal.      Pupils: Pupils are equal, round, and reactive to light.       Cardiovascular:      Rate and Rhythm: Normal rate and regular rhythm.      Pulses: Normal pulses.      Heart sounds: Normal heart sounds. No murmur heard.  Pulmonary:      Effort: Pulmonary effort is normal. No respiratory distress.      Breath sounds: Normal breath sounds.   Abdominal:      General: Bowel sounds are normal.      Palpations: Abdomen is soft.      Tenderness: There is no abdominal tenderness.     Musculoskeletal:         General: " Normal range of motion.      Cervical back: Normal range of motion and neck supple.   Lymphadenopathy:      Cervical: No cervical adenopathy.     Skin:     General: Skin is warm and dry.      Capillary Refill: Capillary refill takes less than 2 seconds.     Neurological:      General: No focal deficit present.      Mental Status: He is alert and oriented to person, place, and time. Mental status is at baseline.      Cranial Nerves: No cranial nerve deficit.     Psychiatric:         Mood and Affect: Mood normal.       Medications have been reviewed by provider in current encounter

## 2025-07-14 NOTE — ASSESSMENT & PLAN NOTE
Orders:  •  naltrexone (REVIA) 50 mg tablet; Take 1 tablet (50 mg total) by mouth daily for 7 days

## 2025-07-14 NOTE — ASSESSMENT & PLAN NOTE
Had a lengthy discussion in regards to alcohol use disorder.  Recommend naltrexone once daily for at least 4 to 6 months.  Patient is overall agreeable to this but was to similar research on his and  Orders:  •  naltrexone (REVIA) 50 mg tablet; Take 1 tablet (50 mg total) by mouth daily for 7 days

## 2025-07-14 NOTE — PROGRESS NOTES
07/14/25 1005   Other Referral/Resources/Interventions Provided:   Referrals Provided: Crisis Hotline;Other (Specify);Support Group;[facility]  (IP and OP AUD tx resources)   Discharge Communications   Discharge planning discussed with: pt   Freedom of Choice Yes   Comments - Freedom of Choice Pt chose OP AUD tx and chose to return to previous providers and schedule appts on his own. CM provided additional resources if needed.   Were Treatment Team discharge recommendations reviewed with patient/caregiver? Yes   Did patient/caregiver verbalize understanding of patient care needs? Yes   Were patient/caregiver advised of the risks associated with not following Treatment Team discharge recommendations? Yes     CM was made aware by medical provider that pt was medically stable for discharge. Pt to discharge same day 7/11/25. Pt denies any withdrawal symptoms at this time. Pt to discharge to home and wife will  upon discharge. Pt to follow up with PCP Itz Dawson MD in 1 week. CM was able to confirm PCP would continue MAT for him. Pt preferred to schedule his own appt. Pt's medications were sent to preferred pharmacy.     Discharge Address: [address] [place] [address]   [place] [address]     Phone Number:[phone] (Mobile)   [phone] (Home Phone)

## (undated) DEVICE — SUCTION COAGULATOR 10FR FOOT CNTRL MEGADYNE

## (undated) DEVICE — NEEDLE SPINAL 22G X 3.5IN  QUINCKE

## (undated) DEVICE — SCD SEQUENTIAL COMPRESSION COMFORT SLEEVE MEDIUM KNEE LENGTH: Brand: KENDALL SCD

## (undated) DEVICE — NEURO PATTIES 1/2 X 3

## (undated) DEVICE — SURGICEL 2 X 3

## (undated) DEVICE — GLOVE SRG BIOGEL 7.5

## (undated) DEVICE — ANTI-FOG SOLUTION WITH FOAM PAD: Brand: DEVON

## (undated) DEVICE — NEEDLE 25G X 1 1/2